# Patient Record
Sex: FEMALE | Race: WHITE | NOT HISPANIC OR LATINO | URBAN - METROPOLITAN AREA
[De-identification: names, ages, dates, MRNs, and addresses within clinical notes are randomized per-mention and may not be internally consistent; named-entity substitution may affect disease eponyms.]

---

## 2023-02-23 LAB
ALANINE AMINOTRANSFERASE (SGPT) (U/L) IN SER/PLAS: 25 U/L (ref 7–45)
ALBUMIN (G/DL) IN SER/PLAS: 4.4 G/DL (ref 3.4–5)
ALKALINE PHOSPHATASE (U/L) IN SER/PLAS: 43 U/L (ref 33–110)
ANION GAP IN SER/PLAS: 13 MMOL/L (ref 10–20)
ASPARTATE AMINOTRANSFERASE (SGOT) (U/L) IN SER/PLAS: 24 U/L (ref 9–39)
BASOPHILS (10*3/UL) IN BLOOD BY AUTOMATED COUNT: 0.02 X10E9/L (ref 0–0.1)
BASOPHILS/100 LEUKOCYTES IN BLOOD BY AUTOMATED COUNT: 0.2 % (ref 0–2)
BILIRUBIN TOTAL (MG/DL) IN SER/PLAS: 0.6 MG/DL (ref 0–1.2)
CALCIUM (MG/DL) IN SER/PLAS: 9.7 MG/DL (ref 8.6–10.3)
CARBON DIOXIDE, TOTAL (MMOL/L) IN SER/PLAS: 24 MMOL/L (ref 21–32)
CHLORIDE (MMOL/L) IN SER/PLAS: 103 MMOL/L (ref 98–107)
CHOLESTEROL (MG/DL) IN SER/PLAS: 229 MG/DL (ref 0–199)
CHOLESTEROL IN HDL (MG/DL) IN SER/PLAS: 42.5 MG/DL
CHOLESTEROL/HDL RATIO: 5.4
COBALAMIN (VITAMIN B12) (PG/ML) IN SER/PLAS: 168 PG/ML (ref 211–911)
CREATININE (MG/DL) IN SER/PLAS: 0.55 MG/DL (ref 0.5–1.05)
EOSINOPHILS (10*3/UL) IN BLOOD BY AUTOMATED COUNT: 0.1 X10E9/L (ref 0–0.7)
EOSINOPHILS/100 LEUKOCYTES IN BLOOD BY AUTOMATED COUNT: 1.2 % (ref 0–6)
ERYTHROCYTE DISTRIBUTION WIDTH (RATIO) BY AUTOMATED COUNT: 13.7 % (ref 11.5–14.5)
ERYTHROCYTE MEAN CORPUSCULAR HEMOGLOBIN CONCENTRATION (G/DL) BY AUTOMATED: 31.8 G/DL (ref 32–36)
ERYTHROCYTE MEAN CORPUSCULAR VOLUME (FL) BY AUTOMATED COUNT: 90 FL (ref 80–100)
ERYTHROCYTES (10*6/UL) IN BLOOD BY AUTOMATED COUNT: 4.59 X10E12/L (ref 4–5.2)
GFR FEMALE: >90 ML/MIN/1.73M2
GLUCOSE (MG/DL) IN SER/PLAS: 79 MG/DL (ref 74–99)
HEMATOCRIT (%) IN BLOOD BY AUTOMATED COUNT: 41.5 % (ref 36–46)
HEMOGLOBIN (G/DL) IN BLOOD: 13.2 G/DL (ref 12–16)
IMMATURE GRANULOCYTES/100 LEUKOCYTES IN BLOOD BY AUTOMATED COUNT: 0.2 % (ref 0–0.9)
LDL: 149 MG/DL (ref 0–99)
LEUKOCYTES (10*3/UL) IN BLOOD BY AUTOMATED COUNT: 8.2 X10E9/L (ref 4.4–11.3)
LYMPHOCYTES (10*3/UL) IN BLOOD BY AUTOMATED COUNT: 2.43 X10E9/L (ref 1.2–4.8)
LYMPHOCYTES/100 LEUKOCYTES IN BLOOD BY AUTOMATED COUNT: 29.7 % (ref 13–44)
MONOCYTES (10*3/UL) IN BLOOD BY AUTOMATED COUNT: 0.54 X10E9/L (ref 0.1–1)
MONOCYTES/100 LEUKOCYTES IN BLOOD BY AUTOMATED COUNT: 6.6 % (ref 2–10)
NEUTROPHILS (10*3/UL) IN BLOOD BY AUTOMATED COUNT: 5.06 X10E9/L (ref 1.2–7.7)
NEUTROPHILS/100 LEUKOCYTES IN BLOOD BY AUTOMATED COUNT: 62.1 % (ref 40–80)
PLATELETS (10*3/UL) IN BLOOD AUTOMATED COUNT: 315 X10E9/L (ref 150–450)
POTASSIUM (MMOL/L) IN SER/PLAS: 3.8 MMOL/L (ref 3.5–5.3)
PROTEIN TOTAL: 7.6 G/DL (ref 6.4–8.2)
SODIUM (MMOL/L) IN SER/PLAS: 136 MMOL/L (ref 136–145)
THYROTROPIN (MIU/L) IN SER/PLAS BY DETECTION LIMIT <= 0.05 MIU/L: 1.1 MIU/L (ref 0.44–3.98)
TRIGLYCERIDE (MG/DL) IN SER/PLAS: 188 MG/DL (ref 0–149)
UREA NITROGEN (MG/DL) IN SER/PLAS: 11 MG/DL (ref 6–23)
VLDL: 38 MG/DL (ref 0–40)

## 2023-03-04 ENCOUNTER — TELEPHONE (OUTPATIENT)
Dept: PRIMARY CARE | Facility: CLINIC | Age: 43
End: 2023-03-04
Payer: COMMERCIAL

## 2023-03-04 DIAGNOSIS — R92.8 ABNORMAL MAMMOGRAM: ICD-10-CM

## 2023-03-04 NOTE — TELEPHONE ENCOUNTER
Per Dr. Stevens, Patient will need extra views from her mammogram and possible u/s  Patient aware  and orders pended for Dr. Stevens

## 2023-04-06 ENCOUNTER — TELEPHONE (OUTPATIENT)
Dept: PRIMARY CARE | Facility: CLINIC | Age: 43
End: 2023-04-06
Payer: COMMERCIAL

## 2023-04-06 NOTE — TELEPHONE ENCOUNTER
----- Message from Lex Stevens, DO sent at 4/6/2023  9:33 AM EDT -----  Ultrasound and mammogram consistent with probable cyst instead of waiting 1 year they want to recheck things in 6 months to be extra careful please put that on her calendar

## 2023-04-24 ENCOUNTER — OFFICE VISIT (OUTPATIENT)
Dept: PRIMARY CARE | Facility: CLINIC | Age: 43
End: 2023-04-24
Payer: COMMERCIAL

## 2023-04-24 VITALS
OXYGEN SATURATION: 99 % | RESPIRATION RATE: 16 BRPM | HEIGHT: 66 IN | WEIGHT: 240 LBS | SYSTOLIC BLOOD PRESSURE: 100 MMHG | DIASTOLIC BLOOD PRESSURE: 64 MMHG | BODY MASS INDEX: 38.57 KG/M2 | TEMPERATURE: 97.3 F | HEART RATE: 88 BPM

## 2023-04-24 DIAGNOSIS — E66.09 CLASS 2 OBESITY DUE TO EXCESS CALORIES WITHOUT SERIOUS COMORBIDITY WITH BODY MASS INDEX (BMI) OF 38.0 TO 38.9 IN ADULT: ICD-10-CM

## 2023-04-24 DIAGNOSIS — Z76.89 ENCOUNTER FOR WEIGHT MANAGEMENT: Primary | ICD-10-CM

## 2023-04-24 PROCEDURE — 3008F BODY MASS INDEX DOCD: CPT | Performed by: FAMILY MEDICINE

## 2023-04-24 PROCEDURE — 99213 OFFICE O/P EST LOW 20 MIN: CPT | Performed by: FAMILY MEDICINE

## 2023-04-24 PROCEDURE — 1036F TOBACCO NON-USER: CPT | Performed by: FAMILY MEDICINE

## 2023-04-24 RX ORDER — SEMAGLUTIDE 0.25 MG/.5ML
0.25 INJECTION, SOLUTION SUBCUTANEOUS
COMMUNITY
Start: 2023-03-30 | End: 2023-04-24 | Stop reason: ALTCHOICE

## 2023-04-24 RX ORDER — SEMAGLUTIDE 0.5 MG/.5ML
0.5 INJECTION, SOLUTION SUBCUTANEOUS
Qty: 2 ML | Refills: 1 | Status: SHIPPED | OUTPATIENT
Start: 2023-04-24 | End: 2023-05-24 | Stop reason: ALTCHOICE

## 2023-04-24 ASSESSMENT — PATIENT HEALTH QUESTIONNAIRE - PHQ9
1. LITTLE INTEREST OR PLEASURE IN DOING THINGS: NOT AT ALL
2. FEELING DOWN, DEPRESSED OR HOPELESS: NOT AT ALL
SUM OF ALL RESPONSES TO PHQ9 QUESTIONS 1 AND 2: 0

## 2023-04-24 NOTE — PROGRESS NOTES
"Subjective   Patient ID: Concetta Dick is a 42 y.o. female who presents for Weight Loss.    HPI    Pt is dieting, exercising and utilizing Wegovy.  Denies any negative SE from the Wegovy.   Pt has lost 15 pounds  Pt BMI today is 38.74  Admits to occasional constipation.    No other concern    Review of systems  ; Patient seen today for exam denies any problems with headaches or vision, denies any shortness of breath chest pain nausea or vomiting, no black stool no blood in the stool no heartburn type symptoms denies any problems with constipation or diarrhea, and no dysuria-type symptoms    The patient's allergies medications were reviewed with them today    The patient's social family and surgical history or also reviewed here today, along with her past medical history.     Objective     Alert and active in  no acute distress  HEENT TMs clear oropharynx negative nares clear no drainage noted neck supple  With no adenopathy   Heart regular rate and rhythm without murmur and no carotid bruits  Lungs- clear to auscultation bilaterally, no wheeze or rhonchi noted  Thyroid -negative masses or nodularity  Abdomen- soft times four quadrants , bowel sounds positive no masses or organomegaly, negative tenderness guarding or rebound  Neurological exam unremarkable- DTRs in upper and lower extremities within normal limits.   skin -no lesions noted      /64 (BP Location: Left arm, Patient Position: Sitting, BP Cuff Size: Large adult)   Pulse 88   Temp 36.3 °C (97.3 °F) (Temporal)   Resp 16   Ht 1.676 m (5' 6\")   Wt 109 kg (240 lb)   SpO2 99%   BMI 38.74 kg/m²     No Known Allergies    Assessment/Plan   Problem List Items Addressed This Visit          Other    Encounter for weight management - Primary     Other Visit Diagnoses       BMI 38.0-38.9,adult        Relevant Medications    semaglutide, weight loss, (Wegovy) 0.5 mg/0.5 mL pen injector    Class 2 obesity due to excess calories without serious " comorbidity with body mass index (BMI) of 38.0 to 38.9 in adult              Increase Wegovy to 0.5 mg.  She is aware that she can get nauseous after the first dose.  If she is doing well, we can increase her to  the next dose, 1 mg.     She will let us know how she is doing in 1 month.    Follow up in office in 3-4 months.    If anything worsens or changes please call us at once, follow up in the office as planned.    Scribe Attestation  By signing my name below, I, Leeann Puga MA, Scribe   attest that this documentation has been prepared under the direction and in the presence of Lex Stevens DO.

## 2023-05-23 ENCOUNTER — TELEPHONE (OUTPATIENT)
Dept: PRIMARY CARE | Facility: CLINIC | Age: 43
End: 2023-05-23

## 2023-05-23 NOTE — TELEPHONE ENCOUNTER
----- Message from Concetta Dick sent at 5/22/2023  8:41 PM EDT -----  Regarding: Your Recent Visit  Contact: [phone]  Hi Dr. Stevens.   I’m just following up from my last visit on 4/24. I took my last injection of the 0.5mg Wegovy yesterday. I am down another 8 lbs this month, and still no side effects.  Can we go up in dosage for my next script?      Thanks!  Concetta Dick

## 2023-07-10 ENCOUNTER — TELEPHONE (OUTPATIENT)
Dept: PRIMARY CARE | Facility: CLINIC | Age: 43
End: 2023-07-10

## 2023-07-10 NOTE — TELEPHONE ENCOUNTER
----- Message from Concetta Dick sent at 7/10/2023  6:39 AM EDT -----  Regarding: Wegovy prescription   Contact: [phone]  Hi Dr. Stevens,  I just finished my last dose of the 1mg Wegovy. I feel good, still losing weight and still having no side effects. I feel like my weight loss has slowed a little, and I am making some changes to my diet and exercise routine. Can we go up in dosage on my next script?     Thanks!  Concetta Dick

## 2023-07-10 NOTE — TELEPHONE ENCOUNTER
Lex Stevens, DO  You3 hours ago (12:46 PM)       Sent 1.7 to her drugstore, see me after 6 weeks   My chart message sent

## 2023-08-07 ENCOUNTER — TELEPHONE (OUTPATIENT)
Dept: PRIMARY CARE | Facility: CLINIC | Age: 43
End: 2023-08-07

## 2023-08-07 NOTE — TELEPHONE ENCOUNTER
----- Message from Concetta Dick sent at 8/7/2023 12:02 PM EDT -----  Regarding: Wegovy prescription   Contact: [phone]  Hi Dr. Stevens,  I just finished my last dose of the 1.7mg Wegovy. I feel good, still losing weight and still having no side effects.  Can we go up to 2.4mg  on my next script?       Thanks!  Concetta Dick   Showing 1 of 1

## 2023-08-07 NOTE — TELEPHONE ENCOUNTER
Please let her know that Dr. Stevens is out of the office this week.  I reviewed things and in my opinion if the 1.7 of Wegovy is working well then I would stay on this dose as we can increase it in the next month or 3 if we need to.        Responded to patient via [employer] message.

## 2023-09-06 NOTE — PROGRESS NOTES
"Subjective   Patient ID: Concetta Dick is a 42 y.o. female who presents for Weight Check.    HPI    Patient presents today for a follow-up on obesity. Is taking Wegovy 1.7 MG. States she's doing well on it, and would like to increase it to the next dose.  Last weight 4-24-23 was 240 lbs.   Has loss 22 pounds since her last visit.  She walks on her days off.  She does walk a lot on the days she is working, approximately 12,000 steps.      Taking current medications which were reviewed.  Problem list discussed.    Overall doing well.  Eating okay.  Staying active.    Has no other new problem /question.    Review of systems  ; Patient seen today for exam denies any problems with headaches or vision, denies any shortness of breath chest pain nausea or vomiting, no black stool no blood in the stool no heartburn type symptoms denies any problems with constipation or diarrhea, and no dysuria-type symptoms    The patient's allergies medications were reviewed with them today    The patient's social family and surgical history or also reviewed here today, along with her past medical history.     Objective     Alert and active in  no acute distress  HEENT TMs clear oropharynx negative nares clear no drainage noted neck supple  With no adenopathy   Heart regular rate and rhythm without murmur and no carotid bruits  Lungs- clear to auscultation bilaterally, no wheeze or rhonchi noted  Thyroid -negative masses or nodularity  Abdomen- soft times four quadrants , bowel sounds positive no masses or organomegaly, negative tenderness guarding or rebound  Neurological exam unremarkable- DTRs in upper and lower extremities within normal limits.   skin -no lesions noted      /70 (BP Location: Right arm, Patient Position: Sitting, BP Cuff Size: Large adult)   Pulse 92   Temp 36 °C (96.8 °F) (Temporal)   Resp 16   Ht 1.676 m (5' 6\")   Wt 98.9 kg (218 lb)   SpO2 96%   BMI 35.19 kg/m²     No Known " Allergies    Assessment/Plan   Problem List Items Addressed This Visit       Encounter for weight management - Primary    BMI 35.0-35.9,adult     Other Visit Diagnoses       Class 2 obesity due to excess calories without serious comorbidity with body mass index (BMI) of 35.0 to 35.9 in adult              Discussed patient's BMI and to institute calorie reduction and increase exercise to decrease risk of diabetes and heart disease in the future.     Increase Wegovy to 2.4 mg.    We will send this for a 3 month supply.   If they do not have this, we will decrease back down to 1.7 mg.     Continue to increase activity and eat healthy diet.    If anything worsens or changes please call us at once, follow up in the office as planned.    Scribe Attestation  By signing my name below, I, Leeann Puga MA, Scribe   attest that this documentation has been prepared under the direction and in the presence of Lex Stevens DO.

## 2023-09-07 ENCOUNTER — OFFICE VISIT (OUTPATIENT)
Dept: PRIMARY CARE | Facility: CLINIC | Age: 43
End: 2023-09-07
Payer: COMMERCIAL

## 2023-09-07 VITALS
RESPIRATION RATE: 16 BRPM | WEIGHT: 218 LBS | HEART RATE: 92 BPM | OXYGEN SATURATION: 96 % | DIASTOLIC BLOOD PRESSURE: 70 MMHG | HEIGHT: 66 IN | SYSTOLIC BLOOD PRESSURE: 116 MMHG | TEMPERATURE: 96.8 F | BODY MASS INDEX: 35.03 KG/M2

## 2023-09-07 DIAGNOSIS — E66.09 CLASS 2 OBESITY DUE TO EXCESS CALORIES WITHOUT SERIOUS COMORBIDITY WITH BODY MASS INDEX (BMI) OF 35.0 TO 35.9 IN ADULT: ICD-10-CM

## 2023-09-07 DIAGNOSIS — Z76.89 ENCOUNTER FOR WEIGHT MANAGEMENT: Primary | ICD-10-CM

## 2023-09-07 PROCEDURE — 1036F TOBACCO NON-USER: CPT | Performed by: FAMILY MEDICINE

## 2023-09-07 PROCEDURE — 99213 OFFICE O/P EST LOW 20 MIN: CPT | Performed by: FAMILY MEDICINE

## 2023-09-07 PROCEDURE — 3008F BODY MASS INDEX DOCD: CPT | Performed by: FAMILY MEDICINE

## 2023-09-07 RX ORDER — SEMAGLUTIDE 2.4 MG/.75ML
2.4 INJECTION, SOLUTION SUBCUTANEOUS
Qty: 9 ML | Refills: 1 | Status: SHIPPED | OUTPATIENT
Start: 2023-09-07 | End: 2024-02-05

## 2024-02-04 DIAGNOSIS — E66.09 CLASS 2 OBESITY DUE TO EXCESS CALORIES WITHOUT SERIOUS COMORBIDITY WITH BODY MASS INDEX (BMI) OF 35.0 TO 35.9 IN ADULT: ICD-10-CM

## 2024-02-05 RX ORDER — SEMAGLUTIDE 2.4 MG/.75ML
2.4 INJECTION, SOLUTION SUBCUTANEOUS
Qty: 3 ML | Refills: 1 | Status: SHIPPED | OUTPATIENT
Start: 2024-02-05 | End: 2024-04-03 | Stop reason: SDUPTHER

## 2024-03-28 DIAGNOSIS — E66.09 CLASS 2 OBESITY DUE TO EXCESS CALORIES WITHOUT SERIOUS COMORBIDITY WITH BODY MASS INDEX (BMI) OF 35.0 TO 35.9 IN ADULT: ICD-10-CM

## 2024-03-28 RX ORDER — SEMAGLUTIDE 2.4 MG/.75ML
2.4 INJECTION, SOLUTION SUBCUTANEOUS
Qty: 3 ML | Refills: 2 | OUTPATIENT
Start: 2024-03-28

## 2024-04-02 NOTE — PROGRESS NOTES
Subjective   Patient ID: Concetta Dick is a 43 y.o. female who presents for Annual Exam and Weight Loss.    HPI    Annual physical   Eats a generally healthy diet   Exercises 3 to 4 x a week  Denies any chest pain,SOB  No Abdominal pain   No black or bloody stools   Urination/BM normal   Last eye apt over 1 year  Last dental apt 3 month  Periods have been normal.  Last Gyn apt 6/2023  No new family h/o cancers or heart disease    Weight loss management Follow up   Taking Wegovy 2.4 mg  Following up for # 1 years   Has been eating a generally healthy diet  Exercises 3 to 4 x per week  What type of exercise walking weight training   Patient last in office weight 218 lbs  Today's in office weight 204 lbs   Patient is - 14 lbs       Review of systems  ; Patient seen today for exam denies any problems with headaches or vision, denies any shortness of breath chest pain nausea or vomiting, no black stool no blood in the stool no heartburn type symptoms denies any problems with constipation or diarrhea, and no dysuria-type symptoms    The patient's allergies medications were reviewed with them today    The patient's social family and surgical history or also reviewed here today, along with her past medical history.     Objective     Alert and active in  no acute distress  HEENT TMs clear oropharynx negative nares clear no drainage noted neck supple  With no adenopathy   Heart regular rate and rhythm without murmur and no carotid bruits  Lungs- clear to auscultation bilaterally, no wheeze or rhonchi noted  Thyroid -negative masses or nodularity  Abdomen- soft times four quadrants , bowel sounds positive no masses or organomegaly, negative tenderness guarding or rebound  Neurological exam unremarkable- DTRs in upper and lower extremities within normal limits.   skin -no lesions noted      /80 (BP Location: Right arm, Patient Position: Sitting, BP Cuff Size: Adult)   Pulse 92   Temp 36.1 °C (97 °F) (Temporal)    "Resp 16   Ht 1.676 m (5' 6\")   Wt 92.5 kg (204 lb)   SpO2 99%   BMI 32.93 kg/m²     No Known Allergies    Assessment/Plan   Problem List Items Addressed This Visit       Encounter for weight management    BMI 35.0-35.9,adult    Class 1 obesity due to excess calories without serious comorbidity with body mass index (BMI) of 32.0 to 32.9 in adult     Other Visit Diagnoses       Routine general medical examination at a health care facility    -  Primary    Relevant Orders    CBC and Auto Differential    Comprehensive Metabolic Panel    B12 deficiency        Relevant Orders    Vitamin B12    Lipid screening        Relevant Orders    Lipid Panel    Breast cancer screening by mammogram        Relevant Orders    BI mammo bilateral screening tomosynthesis    BMI 32.0-32.9,adult        Relevant Medications    semaglutide, weight loss, (Wegovy) 2.4 mg/0.75 mL pen injector          Discussed patient's BMI and to institute calorie reduction and increase exercise to decrease risk of diabetes and heart disease in the future.    Refill Wegovy.    Mammogram ordered.     Declines Tdap vaccine today.    Labs have been ordered, she/he will have these performed and we will contact her/him with results.  (CBC, CMP, Lipid, Vitamin B12)    If anything worsens or changes please call us at once, follow up in the office as planned.    Scribe Attestation  By signing my name below, I, Leeann Puga MA, Scribe   attest that this documentation has been prepared under the direction and in the presence of Lex Stevens DO.         "

## 2024-04-03 ENCOUNTER — OFFICE VISIT (OUTPATIENT)
Dept: PRIMARY CARE | Facility: CLINIC | Age: 44
End: 2024-04-03
Payer: COMMERCIAL

## 2024-04-03 VITALS
TEMPERATURE: 97 F | BODY MASS INDEX: 32.78 KG/M2 | SYSTOLIC BLOOD PRESSURE: 122 MMHG | OXYGEN SATURATION: 99 % | DIASTOLIC BLOOD PRESSURE: 80 MMHG | HEART RATE: 92 BPM | HEIGHT: 66 IN | WEIGHT: 204 LBS | RESPIRATION RATE: 16 BRPM

## 2024-04-03 DIAGNOSIS — Z76.89 ENCOUNTER FOR WEIGHT MANAGEMENT: ICD-10-CM

## 2024-04-03 DIAGNOSIS — Z12.31 BREAST CANCER SCREENING BY MAMMOGRAM: ICD-10-CM

## 2024-04-03 DIAGNOSIS — Z13.220 LIPID SCREENING: ICD-10-CM

## 2024-04-03 DIAGNOSIS — E66.09 CLASS 1 OBESITY DUE TO EXCESS CALORIES WITHOUT SERIOUS COMORBIDITY WITH BODY MASS INDEX (BMI) OF 32.0 TO 32.9 IN ADULT: ICD-10-CM

## 2024-04-03 DIAGNOSIS — E53.8 B12 DEFICIENCY: ICD-10-CM

## 2024-04-03 DIAGNOSIS — Z00.00 ROUTINE GENERAL MEDICAL EXAMINATION AT A HEALTH CARE FACILITY: Primary | ICD-10-CM

## 2024-04-03 PROBLEM — E66.811 CLASS 1 OBESITY DUE TO EXCESS CALORIES WITHOUT SERIOUS COMORBIDITY WITH BODY MASS INDEX (BMI) OF 32.0 TO 32.9 IN ADULT: Status: ACTIVE | Noted: 2024-04-03

## 2024-04-03 PROCEDURE — 1036F TOBACCO NON-USER: CPT | Performed by: FAMILY MEDICINE

## 2024-04-03 PROCEDURE — 99396 PREV VISIT EST AGE 40-64: CPT | Performed by: FAMILY MEDICINE

## 2024-04-03 PROCEDURE — 3008F BODY MASS INDEX DOCD: CPT | Performed by: FAMILY MEDICINE

## 2024-04-03 RX ORDER — SEMAGLUTIDE 2.4 MG/.75ML
2.4 INJECTION, SOLUTION SUBCUTANEOUS
Qty: 9 ML | Refills: 1 | Status: SHIPPED | OUTPATIENT
Start: 2024-04-03

## 2024-04-03 ASSESSMENT — PATIENT HEALTH QUESTIONNAIRE - PHQ9
SUM OF ALL RESPONSES TO PHQ9 QUESTIONS 1 AND 2: 0
2. FEELING DOWN, DEPRESSED OR HOPELESS: NOT AT ALL
1. LITTLE INTEREST OR PLEASURE IN DOING THINGS: NOT AT ALL

## 2024-04-04 ENCOUNTER — TELEPHONE (OUTPATIENT)
Dept: PRIMARY CARE | Facility: CLINIC | Age: 44
End: 2024-04-04
Payer: COMMERCIAL

## 2024-04-04 DIAGNOSIS — R92.8 ABNORMAL MAMMOGRAM: ICD-10-CM

## 2024-04-04 DIAGNOSIS — N60.02 CYST OF LEFT BREAST: ICD-10-CM

## 2024-04-04 NOTE — TELEPHONE ENCOUNTER
Ordered. Need to call patient to let her know.    Primary Information    Source   KapilConcetta starkey (Patient)    Subject   Concetta Dick (Patient)    Topic   Information Request - Get Appointment Information      Summary   Pt of Dr. Stevens   Communication    Information has been provided to Patient.            Concetta Dick called in today schedule her 6 month follow up for her breast US bilateral. Mrs. Dick will need a new order. Please place order and call pt to let her know it is completed so she can call scheduling her best contract number is [phone].

## 2024-04-06 ENCOUNTER — LAB (OUTPATIENT)
Dept: LAB | Facility: LAB | Age: 44
End: 2024-04-06
Payer: COMMERCIAL

## 2024-04-06 DIAGNOSIS — Z00.00 ROUTINE GENERAL MEDICAL EXAMINATION AT A HEALTH CARE FACILITY: ICD-10-CM

## 2024-04-06 DIAGNOSIS — E53.8 B12 DEFICIENCY: ICD-10-CM

## 2024-04-06 DIAGNOSIS — Z13.220 LIPID SCREENING: ICD-10-CM

## 2024-04-06 LAB
ALBUMIN SERPL BCP-MCNC: 4.5 G/DL (ref 3.4–5)
ALP SERPL-CCNC: 39 U/L (ref 33–110)
ALT SERPL W P-5'-P-CCNC: 11 U/L (ref 7–45)
ANION GAP SERPL CALC-SCNC: 10 MMOL/L (ref 10–20)
AST SERPL W P-5'-P-CCNC: 11 U/L (ref 9–39)
BASOPHILS # BLD AUTO: 0.02 X10*3/UL (ref 0–0.1)
BASOPHILS NFR BLD AUTO: 0.3 %
BILIRUB SERPL-MCNC: 0.8 MG/DL (ref 0–1.2)
BUN SERPL-MCNC: 10 MG/DL (ref 6–23)
CALCIUM SERPL-MCNC: 9.5 MG/DL (ref 8.6–10.3)
CHLORIDE SERPL-SCNC: 106 MMOL/L (ref 98–107)
CHOLEST SERPL-MCNC: 205 MG/DL (ref 0–199)
CHOLESTEROL/HDL RATIO: 5.1
CO2 SERPL-SCNC: 26 MMOL/L (ref 21–32)
CREAT SERPL-MCNC: 0.68 MG/DL (ref 0.5–1.05)
EGFRCR SERPLBLD CKD-EPI 2021: >90 ML/MIN/1.73M*2
EOSINOPHIL # BLD AUTO: 0.12 X10*3/UL (ref 0–0.7)
EOSINOPHIL NFR BLD AUTO: 1.7 %
ERYTHROCYTE [DISTWIDTH] IN BLOOD BY AUTOMATED COUNT: 13.7 % (ref 11.5–14.5)
GLUCOSE SERPL-MCNC: 87 MG/DL (ref 74–99)
HCT VFR BLD AUTO: 40 % (ref 36–46)
HDLC SERPL-MCNC: 40.5 MG/DL
HGB BLD-MCNC: 12.9 G/DL (ref 12–16)
IMM GRANULOCYTES # BLD AUTO: 0.01 X10*3/UL (ref 0–0.7)
IMM GRANULOCYTES NFR BLD AUTO: 0.1 % (ref 0–0.9)
LDLC SERPL CALC-MCNC: 142 MG/DL
LYMPHOCYTES # BLD AUTO: 2.63 X10*3/UL (ref 1.2–4.8)
LYMPHOCYTES NFR BLD AUTO: 36.6 %
MCH RBC QN AUTO: 29.3 PG (ref 26–34)
MCHC RBC AUTO-ENTMCNC: 32.3 G/DL (ref 32–36)
MCV RBC AUTO: 91 FL (ref 80–100)
MONOCYTES # BLD AUTO: 0.5 X10*3/UL (ref 0.1–1)
MONOCYTES NFR BLD AUTO: 7 %
NEUTROPHILS # BLD AUTO: 3.91 X10*3/UL (ref 1.2–7.7)
NEUTROPHILS NFR BLD AUTO: 54.3 %
NON HDL CHOLESTEROL: 165 MG/DL (ref 0–149)
NRBC BLD-RTO: 0 /100 WBCS (ref 0–0)
PLATELET # BLD AUTO: 330 X10*3/UL (ref 150–450)
POTASSIUM SERPL-SCNC: 4.1 MMOL/L (ref 3.5–5.3)
PROT SERPL-MCNC: 7.2 G/DL (ref 6.4–8.2)
RBC # BLD AUTO: 4.4 X10*6/UL (ref 4–5.2)
SODIUM SERPL-SCNC: 138 MMOL/L (ref 136–145)
TRIGL SERPL-MCNC: 112 MG/DL (ref 0–149)
VIT B12 SERPL-MCNC: 3135 PG/ML (ref 211–911)
VLDL: 22 MG/DL (ref 0–40)
WBC # BLD AUTO: 7.2 X10*3/UL (ref 4.4–11.3)

## 2024-04-06 PROCEDURE — 82607 VITAMIN B-12: CPT

## 2024-04-06 PROCEDURE — 36415 COLL VENOUS BLD VENIPUNCTURE: CPT

## 2024-04-06 PROCEDURE — 80061 LIPID PANEL: CPT

## 2024-04-06 PROCEDURE — 80053 COMPREHEN METABOLIC PANEL: CPT

## 2024-04-06 PROCEDURE — 85025 COMPLETE CBC W/AUTO DIFF WBC: CPT

## 2024-04-08 ENCOUNTER — TELEPHONE (OUTPATIENT)
Dept: PRIMARY CARE | Facility: CLINIC | Age: 44
End: 2024-04-08
Payer: COMMERCIAL

## 2024-04-11 ENCOUNTER — APPOINTMENT (OUTPATIENT)
Dept: RADIOLOGY | Facility: HOSPITAL | Age: 44
End: 2024-04-11
Payer: COMMERCIAL

## 2024-04-12 ENCOUNTER — TELEPHONE (OUTPATIENT)
Dept: PRIMARY CARE | Facility: CLINIC | Age: 44
End: 2024-04-12
Payer: COMMERCIAL

## 2024-04-12 DIAGNOSIS — N60.02 CYST OF LEFT BREAST: ICD-10-CM

## 2024-04-12 NOTE — TELEPHONE ENCOUNTER
Lili from  radiology called the office stating the patient needed a bilateral diagnostic bilateral mammogram ordered per her last mammogram.

## 2024-04-17 ENCOUNTER — APPOINTMENT (OUTPATIENT)
Dept: RADIOLOGY | Facility: HOSPITAL | Age: 44
End: 2024-04-17
Payer: COMMERCIAL

## 2024-04-22 ENCOUNTER — HOSPITAL ENCOUNTER (OUTPATIENT)
Dept: RADIOLOGY | Facility: HOSPITAL | Age: 44
Discharge: HOME | End: 2024-04-22
Payer: COMMERCIAL

## 2024-04-22 ENCOUNTER — APPOINTMENT (OUTPATIENT)
Dept: RADIOLOGY | Facility: HOSPITAL | Age: 44
End: 2024-04-22
Payer: COMMERCIAL

## 2024-04-22 ENCOUNTER — TELEPHONE (OUTPATIENT)
Dept: PRIMARY CARE | Facility: CLINIC | Age: 44
End: 2024-04-22
Payer: COMMERCIAL

## 2024-04-22 VITALS — HEIGHT: 66 IN | BODY MASS INDEX: 32.47 KG/M2 | WEIGHT: 202 LBS

## 2024-04-22 DIAGNOSIS — N60.02 CYST OF LEFT BREAST: ICD-10-CM

## 2024-04-22 DIAGNOSIS — R92.8 ABNORMAL MAMMOGRAM: ICD-10-CM

## 2024-04-22 PROCEDURE — 77062 BREAST TOMOSYNTHESIS BI: CPT

## 2024-04-22 PROCEDURE — 77062 BREAST TOMOSYNTHESIS BI: CPT | Performed by: RADIOLOGY

## 2024-04-22 PROCEDURE — 76642 ULTRASOUND BREAST LIMITED: CPT | Performed by: RADIOLOGY

## 2024-04-22 PROCEDURE — 76982 USE 1ST TARGET LESION: CPT | Mod: LT

## 2024-04-22 PROCEDURE — 77066 DX MAMMO INCL CAD BI: CPT | Performed by: RADIOLOGY

## 2024-04-22 PROCEDURE — 76642 ULTRASOUND BREAST LIMITED: CPT | Mod: LT

## 2024-04-22 NOTE — TELEPHONE ENCOUNTER
----- Message from Lex Stevens DO sent at 4/22/2024  4:31 PM EDT -----  She is probably aware, she has a cyst on her ultrasound and fibrocystic changes may be less coffee in her diet less caffeine, not none but a lot less would be helpful,,, repeat mammogram in 1 year

## 2024-04-23 ENCOUNTER — APPOINTMENT (OUTPATIENT)
Dept: RADIOLOGY | Facility: HOSPITAL | Age: 44
End: 2024-04-23
Payer: COMMERCIAL

## 2024-09-16 ENCOUNTER — APPOINTMENT (OUTPATIENT)
Dept: OBSTETRICS AND GYNECOLOGY | Facility: CLINIC | Age: 44
End: 2024-09-16
Payer: COMMERCIAL

## 2024-09-27 RX ORDER — SEMAGLUTIDE 2.4 MG/.75ML
2.4 INJECTION, SOLUTION SUBCUTANEOUS
Qty: 3 ML | Refills: 0 | Status: SHIPPED | OUTPATIENT
Start: 2024-09-29

## 2024-10-15 ENCOUNTER — APPOINTMENT (OUTPATIENT)
Dept: OBSTETRICS AND GYNECOLOGY | Facility: CLINIC | Age: 44
End: 2024-10-15
Payer: COMMERCIAL

## 2024-11-11 ENCOUNTER — PREP FOR PROCEDURE (OUTPATIENT)
Dept: OBSTETRICS AND GYNECOLOGY | Facility: CLINIC | Age: 44
End: 2024-11-11

## 2024-11-11 ENCOUNTER — APPOINTMENT (OUTPATIENT)
Dept: OBSTETRICS AND GYNECOLOGY | Facility: CLINIC | Age: 44
End: 2024-11-11
Payer: COMMERCIAL

## 2024-11-11 VITALS
BODY MASS INDEX: 33.27 KG/M2 | HEIGHT: 66 IN | WEIGHT: 207 LBS | DIASTOLIC BLOOD PRESSURE: 78 MMHG | SYSTOLIC BLOOD PRESSURE: 132 MMHG

## 2024-11-11 DIAGNOSIS — Z01.818 PREOP TESTING: ICD-10-CM

## 2024-11-11 DIAGNOSIS — N81.10 PELVIC ORGAN PROLAPSE QUANTIFICATION STAGE 2 CYSTOCELE: Primary | ICD-10-CM

## 2024-11-11 DIAGNOSIS — N81.10 POP-Q STAGE 2 CYSTOCELE: Primary | ICD-10-CM

## 2024-11-11 DIAGNOSIS — R39.15 URINARY URGENCY: ICD-10-CM

## 2024-11-11 LAB
POC APPEARANCE, URINE: CLEAR
POC BILIRUBIN, URINE: NEGATIVE
POC BLOOD, URINE: NEGATIVE
POC COLOR, URINE: YELLOW
POC GLUCOSE, URINE: NEGATIVE MG/DL
POC KETONES, URINE: NEGATIVE MG/DL
POC LEUKOCYTES, URINE: NEGATIVE
POC NITRITE,URINE: NEGATIVE
POC PH, URINE: 6 PH
POC PROTEIN, URINE: NEGATIVE MG/DL
POC SPECIFIC GRAVITY, URINE: 1.02
POC UROBILINOGEN, URINE: 0.2 EU/DL

## 2024-11-11 PROCEDURE — 1036F TOBACCO NON-USER: CPT | Performed by: STUDENT IN AN ORGANIZED HEALTH CARE EDUCATION/TRAINING PROGRAM

## 2024-11-11 PROCEDURE — 99205 OFFICE O/P NEW HI 60 MIN: CPT | Performed by: STUDENT IN AN ORGANIZED HEALTH CARE EDUCATION/TRAINING PROGRAM

## 2024-11-11 PROCEDURE — 81003 URINALYSIS AUTO W/O SCOPE: CPT | Performed by: STUDENT IN AN ORGANIZED HEALTH CARE EDUCATION/TRAINING PROGRAM

## 2024-11-11 PROCEDURE — 3008F BODY MASS INDEX DOCD: CPT | Performed by: STUDENT IN AN ORGANIZED HEALTH CARE EDUCATION/TRAINING PROGRAM

## 2024-11-11 RX ORDER — CEFAZOLIN SODIUM 2 G/100ML
2 INJECTION, SOLUTION INTRAVENOUS ONCE
OUTPATIENT
Start: 2024-11-11 | End: 2024-11-11

## 2024-11-11 RX ORDER — ACETAMINOPHEN 325 MG/1
975 TABLET ORAL ONCE
OUTPATIENT
Start: 2024-11-11 | End: 2024-11-11

## 2024-11-11 RX ORDER — PHENAZOPYRIDINE HYDROCHLORIDE 200 MG/1
200 TABLET, FILM COATED ORAL ONCE
OUTPATIENT
Start: 2024-11-11 | End: 2024-11-11

## 2024-11-11 RX ORDER — CELECOXIB 400 MG/1
400 CAPSULE ORAL ONCE
OUTPATIENT
Start: 2024-11-11 | End: 2024-11-11

## 2024-11-11 RX ORDER — GABAPENTIN 600 MG/1
600 TABLET ORAL ONCE
OUTPATIENT
Start: 2024-11-11 | End: 2024-11-11

## 2024-11-11 ASSESSMENT — PAIN SCALES - GENERAL: PAINLEVEL_OUTOF10: 0-NO PAIN

## 2024-11-11 NOTE — PROGRESS NOTES
New pt here for prolapse and urgency   PVR = 8ml    Chaperone declined: Christianne Cueto, CM3      Referred by: self     PCP  Lex Stevens DO         CHIEF COMPLAINT:  prolapse and urgency         HISTORY OF PRESENT ILLNESS:  This is a  44 y.o. y.o. female who presents with prolapse, urinary urgency, for past year getting worse.     The following were reviewed to gain additional history:  External notes: Dr. Stevens 4/3/24, weight loss, annual exam  OB GYN visits for IUD insertion   Test results: Cr 0.68 24         Specifically, she describes the following pelvic floor symptoms:          Prolapse: Yes       - Splinting to urinate: No       - Splinting for bowel movement/stool trapping: No              Incontinence:  No                        Urinary Symptoms:       - Frequency:  Yes             # Voids:         - Nocturia: Yes             # Voids:  once       - Urgency:  Yes       - Incomplete emptying:  No       - Hesitancy:  No       - Pain with voiding:  No       - Excessive fluid intake: No, 40 oz water                History:       - Recurrent UTI:  No       - Hematuria:  No       - Stones:  No       - Kidney Disease:  No                  Bowel Symptoms:       - Regular: Yes       - Diarrhea:No       - Constipation: No       - Fecal Incontinence:  No       - Flatus Incontinence:  No       - Fecal urgency:   No    Past medical and surgical hx reviewed - pertinent for   PMH none  PSH denies  Smoking history: denies        Gyn History:  - Postmenopause: No  - Pap up to date: No, does not have GYN currently, last pap 6-7 years   History of abnormal pap: No  - Sexually active:  Yes  Dyspareunia: Yes, deeper   Other issues:   - Number of prior vaginal deliveries: 2   Number of prior operative deliveries: 0   Prior OASI? 0  - Number of prior c-sections: 0    - Mammogram up to date: Yes  - Colonoscopy up to date: No    OB History          2    Para   2    Term   2            AB        Living  "            SAB        IAB        Ectopic        Multiple        Live Births                           PHYSICAL EXAMINATION:  Patient's last menstrual period was 10/31/2024.  Body mass index is 33.41 kg/m².  /78   Ht 1.676 m (5' 6\")   Wt 93.9 kg (207 lb)   LMP 10/31/2024   BMI 33.41 kg/m²   General Appearance: well appearing  Neuro: Alert and oriented   HEENT: mucous membranes moist, neck supple  Resp: No respiratory distress, normal work of breathing  MSK: normal range of motion, gait appropriate    Pelvic:  Genitourinary: normal external genitalia, Bartholin's glands negative, [place]'s glands negative  Urethra: normal meatus, non-tender, no periurethral mass  Vaginal mucosa  normal  Cervix normal  Uterus normal size, non-tender, mobile  Adnexae  negative nontender, no masses  Atrophy negative    CST negative    POP-Q (in supine position):       Aa +1     Ba +1     C 0              gh 5     pb 2     tvl 9              Ap 0     Bp 0     D -4    Rectal: no hemorrhoids, fissures or masses    PVR (by Ultrasound): 8 ml     IMPRESSION AND PLAN:  Concetta Dick is a 44 y.o. who presents with Stage 2 POP, urinary urgency.    POP  - reviewed risk factors, natural history and etiology of prolapse  - discussed management options for prolapse including expectant management, PFPT, pessary fitting, and surgery  - opting for surgery  - We discussed options for surgical management of prolapse including robotic surgery with mesh (sacrocolpopexy) and vaginal native tissue approaches and risks and benefits of each  - Counseled on success rates for each approach: ~95% for SCP and ~80-85% for vaginal native tissue approach  - Reviewed risks of mesh, specifically mesh erosion rate which occurs on the order of 2-4% and can often be managed with vaginal estrogen cream but very rarely requires return to operating room for mesh excision  - Reviewed postoperative restrictions: no lifting over 10 pounds for 6 weeks, pelvic rest " for 6 weeks, and no driving for first 1-2 weeks  - she works as an OB/postpartum nurse at [employer], advised likely 2 weeks minimum off work, and reviewed importance of lifting restriction  - offered PREMIER trial enrollment but she prefers a non-mesh procedure and declines  - opting for TVH, SSLF  - Reviewed plan for same-day surgery without overnight stay  - Will need urodynamics prior to surgery? Yes  - Will need PCP/specialist clearance? No  - Surgical plan: TVH, SSLF, A/P repair, cystoscopy, possible sling, location: [facility] preferred but OK with [facility]    OAB  - discussed etiology of OAB and potential management options  - reviewed bladder health recommendations (fluid intake, avoiding bladder triggers)  - briefly discussed treatment options but reviewed will have to see how symptoms settle out after surgery  - if still bothered by urgency post-op can consider medication or other intervention at that time    All questions and concerns were answered and addressed.  The patient expressed understanding and agrees with the plan.     RTC for UDS and virtual visit to review results. Will need to review POUR risk at UDS follow up appointment    11/11/2024

## 2024-11-13 RX ORDER — SEMAGLUTIDE 2.4 MG/.75ML
2.4 INJECTION, SOLUTION SUBCUTANEOUS
Qty: 3 ML | Refills: 0 | Status: SHIPPED | OUTPATIENT
Start: 2024-11-17

## 2024-11-14 ENCOUNTER — TELEPHONE (OUTPATIENT)
Dept: OBSTETRICS AND GYNECOLOGY | Facility: CLINIC | Age: 44
End: 2024-11-14
Payer: COMMERCIAL

## 2024-11-14 PROBLEM — N81.10 POP-Q STAGE 2 CYSTOCELE: Status: ACTIVE | Noted: 2024-11-11

## 2024-11-14 NOTE — TELEPHONE ENCOUNTER
Call to patient to schedule surgery with [facility]. Patient agrees with date of 12/17/2024 for a vaginal hysterectomy, bilateral salpingectomy, possible mid-urethral sling, possible anterior and posterior repairs and cystoscopy.

## 2024-11-20 ENCOUNTER — TELEPHONE (OUTPATIENT)
Dept: OBSTETRICS AND GYNECOLOGY | Facility: CLINIC | Age: 44
End: 2024-11-20
Payer: COMMERCIAL

## 2024-11-20 NOTE — TELEPHONE ENCOUNTER
FMLA/ STD forms completed for pt 12/17/24 surgery. Faxed to [facility] @ [phone]. Copy scanned into chart.

## 2024-11-25 ENCOUNTER — TELEPHONE (OUTPATIENT)
Dept: OBSTETRICS AND GYNECOLOGY | Facility: CLINIC | Age: 44
End: 2024-11-25
Payer: COMMERCIAL

## 2024-11-25 NOTE — TELEPHONE ENCOUNTER
FMLA forms updated to reflect 6 wks Post -op recovery. Faxed to [facility] @ [phone]. Copy scanned into chart. ELROY informing pt.

## 2024-12-06 ENCOUNTER — OFFICE VISIT (OUTPATIENT)
Dept: OBSTETRICS AND GYNECOLOGY | Facility: CLINIC | Age: 44
End: 2024-12-06
Payer: COMMERCIAL

## 2024-12-06 ENCOUNTER — APPOINTMENT (OUTPATIENT)
Dept: OBSTETRICS AND GYNECOLOGY | Facility: CLINIC | Age: 44
End: 2024-12-06
Payer: COMMERCIAL

## 2024-12-06 DIAGNOSIS — N39.3 SUI (STRESS URINARY INCONTINENCE, FEMALE): Primary | ICD-10-CM

## 2024-12-06 DIAGNOSIS — N39.3 SUI (STRESS URINARY INCONTINENCE, FEMALE): ICD-10-CM

## 2024-12-06 DIAGNOSIS — N81.10 POP-Q STAGE 2 CYSTOCELE: ICD-10-CM

## 2024-12-06 PROCEDURE — 51741 ELECTRO-UROFLOWMETRY FIRST: CPT | Performed by: STUDENT IN AN ORGANIZED HEALTH CARE EDUCATION/TRAINING PROGRAM

## 2024-12-06 PROCEDURE — 99214 OFFICE O/P EST MOD 30 MIN: CPT | Mod: 25 | Performed by: STUDENT IN AN ORGANIZED HEALTH CARE EDUCATION/TRAINING PROGRAM

## 2024-12-06 PROCEDURE — 51798 US URINE CAPACITY MEASURE: CPT | Performed by: STUDENT IN AN ORGANIZED HEALTH CARE EDUCATION/TRAINING PROGRAM

## 2024-12-06 PROCEDURE — 51797 INTRAABDOMINAL PRESSURE TEST: CPT | Performed by: STUDENT IN AN ORGANIZED HEALTH CARE EDUCATION/TRAINING PROGRAM

## 2024-12-06 PROCEDURE — 51729 CYSTOMETROGRAM W/VP&UP: CPT | Performed by: STUDENT IN AN ORGANIZED HEALTH CARE EDUCATION/TRAINING PROGRAM

## 2024-12-06 PROCEDURE — 51784 ANAL/URINARY MUSCLE STUDY: CPT | Performed by: STUDENT IN AN ORGANIZED HEALTH CARE EDUCATION/TRAINING PROGRAM

## 2024-12-06 NOTE — PROGRESS NOTES
Patient ID: Concetta Dick is a 44 y.o. female.    Uroflowmetry    Date/Time: 12/6/2024 1:21 PM    Performed by: Mirna Strauss MA  Authorized by: Shahla Donald MD    Procedure discussed: discussed risks, benefits and alternatives    Chaperone present: no    Timeout: timeout called immediately prior to procedure    Position: sitting    Procedure Details     Procedure: CMG with UPP/voiding pressures, EMG, intra-abdominal voiding study and uroflow      CMG with UPP/Voiding Pressures Details:     First urge to void (mL): 146    Urgency to void (mL): 203    Bladder capacity (mL): 300    Intra-abdominal Voiding Study Details:     Rectal catheter placed to record intra-abdominal pressure: yes      Uroflow Details:     Pre-void volume (mL): 206.3    Voiding duration (sec): 14.7    Average flow rate (mL/sec): 14    Max flow rate (mL/sec): 34.6    Voided urine (mL): 433    Residual urine (mL): 0    Post-Procedure Details     Outcome: patient tolerated procedure well with no complications      Additional Details      + stress incontinence.    Urodynamics Results    Uroflowmetry:   Maximum Flow Rate: 34.6 ml/s   Average Flow: 14.0 ml/s   Volume Voided: 206.3 ml   Post void residual: 50 ml    Cystometry:   First desire: 146 ml   P detrusor: -2 cm H2O   Strong desire: 2-2  ml   P detrusor: -3 cm H2O  Bladder Capacity: 300 ml   End filling detrusor pressure: -1 cm H2O   Bladder sensation: normal    Detrusor activity: normal (negative DO)    Compliance: normal  ROMEO: + cough leak at 300 ml  Urethral pressure profile (UPP): fair  Maximum urethral closure pressure: 44 cm H2O    Voiding Study:   Maximum flow: 28.7 ml/s   Average flow: 10.1 ml/s   P detrusor at peak flow: 3.6 cm H2O   Volume voided: 433.3 ml   Pattern:  bell curve   Mechanism of voiding: detrusor contraction  Detrusor contraction with void: good    UDS INTERPRETATION    Uroflow: normal voided volume, appropriate flow pattern, PVR 50 ml    CMG: normal compliance,  normal sensation, negative DO, + ROMEO    Voiding: normal flow pattern, normal detrusor pressure, normal voiding mechanism    SUMMARY:  +ROMEO, candidate for anti-incontinence procedure at time of prolapse repair if desired    Shahla Donald MD

## 2024-12-06 NOTE — PROGRESS NOTES
HISTORY OF PRESENT ILLNESS:  Concetta Dick is a 44 y.o. female, who presents in follow up for stage 2 uterovaginal prolapse     During last encounter on 11/11/24, reviewed and agreed to the following:  Concetta Dick is a 44 y.o. who presents with Stage 2 POP, urinary urgency.     POP  - reviewed risk factors, natural history and etiology of prolapse  - discussed management options for prolapse including expectant management, PFPT, pessary fitting, and surgery  - opting for surgery  - We discussed options for surgical management of prolapse including robotic surgery with mesh (sacrocolpopexy) and vaginal native tissue approaches and risks and benefits of each  - Counseled on success rates for each approach: ~95% for SCP and ~80-85% for vaginal native tissue approach  - Reviewed risks of mesh, specifically mesh erosion rate which occurs on the order of 2-4% and can often be managed with vaginal estrogen cream but very rarely requires return to operating room for mesh excision  - Reviewed postoperative restrictions: no lifting over 10 pounds for 6 weeks, pelvic rest for 6 weeks, and no driving for first 1-2 weeks  - 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 Yes  - Will need PCP/specialist clearance? No  - Surgical plan: TVH, SSLF, A/P repair, cystoscopy, possible sling, location: [facility] preferred but OK with [place]     OAB  - discussed etiology of OAB and potential management options  - reviewed bladder health recommendations (fluid intake, avoiding bladder triggers)  - briefly discussed treatment options but reviewed will have to see how symptoms settle out after surgery  - if still bothered by urgency post-op  can consider medication or other intervention at that time     All questions and concerns were answered and addressed.  The patient expressed understanding and agrees with the plan.      RTC for UDS and virtual visit to review results. Will need to review POUR risk at UDS follow up appointment    Today she reports   Here for UDS results (had UDS done today, added onto schedule for UDS follow up given time constraints with scheduling)    The following were reviewed to gain additional history:  External notes: n/a  Test results:   UDS  Confirmed ROMEO  Normal voiding mechanism  Negative DO, normal capacity, normal compliance          PHYSICAL EXAMINATION:  Patient's last menstrual period was 10/31/2024.  There is no height or weight on file to calculate BMI.  LMP 10/31/2024     General Appearance: well appearing  Neuro: Alert and oriented   HEENT: mucous membranes moist, neck supple  Resp: No respiratory distress, normal work of breathing  MSK: normal range of motion, gait appropriate    Pelvic: deferred  IMPRESSION AND PLAN:  Concetta Dick is a 44 y.o. who presents in follow up for UDS follow up    Pre-op  - reviewed findings of UDS confirming ROMEO as above  - Reviewed options for surgical management of ROMEO: midurethral sling versus periurethral bulking injections and steps of each procedure  - Counseled on success rates of each: sling ~95% successful and bulkamid 60-70% success  - Reviewed risks/benefits of each option. Sling carries the benefit of higher success rate but has 2 week recovery period with no lifting over 10 pounds, and involves mesh which carries a 2-4% risk of mesh exposure. Bulking carries lower success rate but has no recovery period and no mesh risk involved.  - Counseled on management of mesh exposure if that should happen: often managed with vaginal estrogen in the office and rarely requires return to OR for mesh excision  - Reviewed risk of urinary retention requiring indwelling catheter  temporarily which would be removed in the office following surgery  - she is currently not bothered by ROMEO and would like to take a conservative approach of just repairing prolapse and monitoring for occult ROMEO in the future  - Reviewed POUR risk possibly requiring catheter for management and she understands    Works as OB/PP nurse at [employer]    Overall surgical plan:  TVH, SSLF, A/P repair, cystoscopy  Schedule 12/17/24    All questions and concerns were answered and addressed.  The patient expressed understanding and agrees with the plan.     Shahla Donald MD

## 2024-12-09 ENCOUNTER — APPOINTMENT (OUTPATIENT)
Dept: PRIMARY CARE | Facility: CLINIC | Age: 44
End: 2024-12-09
Payer: COMMERCIAL

## 2024-12-09 VITALS
TEMPERATURE: 97.2 F | HEIGHT: 66 IN | DIASTOLIC BLOOD PRESSURE: 72 MMHG | BODY MASS INDEX: 33.11 KG/M2 | OXYGEN SATURATION: 99 % | SYSTOLIC BLOOD PRESSURE: 118 MMHG | HEART RATE: 82 BPM | WEIGHT: 206 LBS | RESPIRATION RATE: 16 BRPM

## 2024-12-09 DIAGNOSIS — Z76.89 ENCOUNTER FOR WEIGHT MANAGEMENT: ICD-10-CM

## 2024-12-09 DIAGNOSIS — E66.811 CLASS 1 OBESITY DUE TO EXCESS CALORIES WITHOUT SERIOUS COMORBIDITY WITH BODY MASS INDEX (BMI) OF 33.0 TO 33.9 IN ADULT: ICD-10-CM

## 2024-12-09 DIAGNOSIS — E66.09 CLASS 1 OBESITY DUE TO EXCESS CALORIES WITHOUT SERIOUS COMORBIDITY WITH BODY MASS INDEX (BMI) OF 33.0 TO 33.9 IN ADULT: ICD-10-CM

## 2024-12-09 DIAGNOSIS — K59.03 DRUG-INDUCED CONSTIPATION: Primary | ICD-10-CM

## 2024-12-09 PROCEDURE — 1036F TOBACCO NON-USER: CPT | Performed by: FAMILY MEDICINE

## 2024-12-09 PROCEDURE — 3008F BODY MASS INDEX DOCD: CPT | Performed by: FAMILY MEDICINE

## 2024-12-09 PROCEDURE — 99213 OFFICE O/P EST LOW 20 MIN: CPT | Performed by: FAMILY MEDICINE

## 2024-12-09 NOTE — PROGRESS NOTES
Subjective   Patient ID: Concetta Dick is a 44 y.o. female who presents for Weight Loss.  HPI  Weight loss management Follow up   Taking Wegovy 2.4 mg weekly, 2 doses remaining.   Following up for # over 1 years   Has been eating a generally healthy diet  Exercises 3 to 4 x per week  Type of exercise - walking and weight training   Patient last in-office weight 207 lbs  Today's in-office weight 206 lbs   Patient is -1 lbs   Pt wants to discuss other weight loss options   Pt states that the Wegovy is not working for her      At times she is getting more constipated and little frustrated with that also    Sometimes nausea at this higher dose like to see what other options she has such as Zepbound which we discussed in the past      Does get some neck aches and pains at times 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 Donald.         No other concern /question       Review of systems  ; Patient seen today for exam denies any problems with headaches or vision, denies any shortness of breath chest pain nausea or vomiting, no black stool no blood in the stool no heartburn type symptoms denies any problems with constipation or diarrhea, and no dysuria-type symptoms    The patient's allergies medications were reviewed with them today    The patient's social family and surgical history or also reviewed here today, along with her past medical history.     Objective     Alert and active in  no acute distress  HEENT TMs clear oropharynx negative nares clear no drainage noted neck supple  With no adenopathy   Heart regular rate and rhythm without murmur and no carotid bruits  Lungs- clear to auscultation bilaterally, no wheeze or rhonchi noted  Thyroid -negative masses or nodularity  Abdomen- soft times four quadrants , bowel sounds positive no  "masses or organomegaly, negative tenderness guarding or rebound  Neurological exam unremarkable- DTRs in upper and lower extremities within normal limits.   skin -no lesions noted      /72 (BP Location: Right arm, Patient Position: Sitting, BP Cuff Size: Large adult)   Pulse 82   Temp 36.2 °C (97.2 °F) (Temporal)   Resp 16   Ht 1.676 m (5' 6\")   Wt 93.4 kg (206 lb)   LMP 10/31/2024   SpO2 99%   BMI 33.25 kg/m²     No Known Allergies    Assessment/Plan   Problem List Items Addressed This Visit       Encounter for weight management    BMI 33.0-33.9,adult    Relevant Medications    tirzepatide, weight loss, (Zepbound) 10 mg/0.5 mL injection    Class 1 obesity due to excess calories without serious comorbidity with body mass index (BMI) of 33.0 to 33.9 in adult     Other Visit Diagnoses       Drug-induced constipation    -  Primary          Discussed patient's BMI and to institute calorie reduction and increase exercise to decrease risk of diabetes and heart disease in the future.    Given her poor response to Wegovy, we prescribed Zepbound today, to be used 10 mg weekly.  And we will see how she does if things are doing better after her surgery may go back on Wegovy with Topamax also to help with curbing her appetite    If anything worsens or changes please call us at once, follow up in the office as planned,       Scribe Attestation  By signing my name below, I, Daya Noguera, Scribe   attest that this documentation has been prepared under the direction and in the presence of Lex Stevens DO.  "

## 2024-12-16 ENCOUNTER — APPOINTMENT (OUTPATIENT)
Dept: OBSTETRICS AND GYNECOLOGY | Facility: CLINIC | Age: 44
End: 2024-12-16
Payer: COMMERCIAL

## 2024-12-16 NOTE — DISCHARGE INSTRUCTIONS
"  UROGYNECOLOGY POST-OPERATIVE INSTRUCTIONS    GENERAL:  It is recommended that a responsible adult stay with you for 24 hours after receiving anesthesia. Do not make any important decisions, sign any important documents, or drive for about 24 hours.    FOOD:  You can eat your normal regular food.  There are no restrictions.     ACTIVITY  1. You will feel tired and weak because your body is focusing on healing.  2. We encourage you to walk. You will get tired quickly so take breaks when you need to. Then get up and move around again. It is important NOT to lie in bed all day. Being active throughout the recovery process is the best way to speed up your healing and avoid complications.   3. If you had vaginal surgery, it will hurt to sit.  Sit on \"one cheek\" or the other, use a soft cushion or sofa, or consider purchasing a \"donut\" pillow from your local pharmacy.  4. You can go outside the house.   5. You can go up and down the stairs.   6. No formal exercising (e.g. gym, treadmill, bike, weight-lifting, power walking etc) until cleared by Dr. Donald  7. You can do light housework (e.g. dusting, light meals, very small loads of laundry)  8. Do not  things over 10 lbs (about the weight of one gallon of milk).  9. No driving for 1 week or while taking narcotic pain medication. You can drive once pain is improved and you feel you are able to comfortably push on the brakes (quickly if needed to avoid an accident) and navigate your vehicle.  10. You can shower but no tub soaks until cleared by Dr. Donald.      PAIN:  Unfortunately surgery hurts!  But there are lots of ways to control pain.    1) Ice packs or warm packs help a lot with pain!  You can use either, whichever feels better to you. If you don't have reusable ice packs at home, consider freezing some water bottles to place over the vaginal area. Apply ice for 20 minutes at a time.  2) Alternate between tylenol 1000 mg and ibuprofen 600 mg every 3 hours. " So when you wake up, take tylenol 1000 mg, then three hours later take ibuprofen 600 mg, then three hours later tylenol 1000 mg, etc. This will ensure you always have some strong pain medicine in your system to help prevent pain. Follow this schedule for at least the first three days after surgery, then you can space out the doses if you feel like your pain is minimal.  3) Save the narcotic pain medication (if you were given narcotics) for prior to bedtime.  Take it in the daytime ONLY if Tylenol XS, ibuprofen, and cold/warm packs are not controlling the pain.  4) Narcotics (such as oxycodone or dilaudid) cause constipation! If you are needing to take more than prescribed, please call Dr. Donald's office.      CONSTIPATION: Avoiding constipation is key after surgery!  1) Take 1 capful of Miralax daily  2) If you have not moved your bowels within 48 hours or 2 days of surgery, increase frequency of Miralax to twice a day  3) The goal is to have toothpaste consistency stools. You can decrease the amount of Miralax if you are having diarrhea.  4) If you have not had a bowel movement 5 days after surgery, call Dr. Donald's office.     WOUND CARE:  1.  If you had vaginal surgery, you will have light vaginal bleeding or discharge that will go on for 6 weeks, and possibly longer than that.  You will need a light pad.  If you have heavy bleeding (enough to fully soak a pad in an hour or less), call Dr. Donald's office. You may also notice some clots passing particularly if you just got up from being in bed or sitting for a while - this is normal!  2. If you had robotic or laparoscopic surgery, just keep the incisions clean.  No creams or ointments or dressings.  3. You may shower daily, no special soaps or cleansing agents.  4. Dr. Donald may ask you to insert estrogen cream in the vagina using an applicator.  This is safe and will not hurt you in any way. Sometimes it can stir up a little bleeding, but it is generally  not heavy.    CATHETER CARE (if you go home with a catheter):  1. Simply empty the bag every 3-4 hours.  2. You can shower and let the catheter get wet, pat it dry with a towel.  3. Dr. Donald's office will call you to set up an appointment for catheter removal. If you have not heard from them within 1-2 business days, call the office.    REASONS TO CALL US  1. If you have a fever (temperature more than 100.3 degrees). Please check your temperature prior to calling.  2. If your pain is not controlled after taking the medications as recommended above.  3. If you are vomiting and unable to hold down food or liquid.  4. If you are unable to urinate despite having an urge to do so. Similarly, if you have a catheter in place and have a strong urge to urinate but are not seeing urine draining into the bag, give us a call.  5. If you are unable to have a bowel movement despite following the recommendations listed above.    If you ever feel that something isn't right or you have concerns, please don't hesitate to call the office!    HOW TO REACH US:  [place] patients: [phone], option #2 then option #3  [place] patients: [phone], option #2 then option #3  [place] patients: [phone]

## 2024-12-17 ENCOUNTER — ANESTHESIA EVENT (OUTPATIENT)
Dept: OPERATING ROOM | Facility: HOSPITAL | Age: 44
End: 2024-12-17
Payer: COMMERCIAL

## 2024-12-17 ENCOUNTER — HOSPITAL ENCOUNTER (OUTPATIENT)
Facility: HOSPITAL | Age: 44
Setting detail: OUTPATIENT SURGERY
Discharge: HOME | End: 2024-12-17
Attending: STUDENT IN AN ORGANIZED HEALTH CARE EDUCATION/TRAINING PROGRAM | Admitting: STUDENT IN AN ORGANIZED HEALTH CARE EDUCATION/TRAINING PROGRAM
Payer: COMMERCIAL

## 2024-12-17 ENCOUNTER — ANESTHESIA (OUTPATIENT)
Dept: OPERATING ROOM | Facility: HOSPITAL | Age: 44
End: 2024-12-17
Payer: COMMERCIAL

## 2024-12-17 VITALS
HEIGHT: 66 IN | HEART RATE: 77 BPM | DIASTOLIC BLOOD PRESSURE: 76 MMHG | SYSTOLIC BLOOD PRESSURE: 132 MMHG | TEMPERATURE: 97.5 F | RESPIRATION RATE: 16 BRPM | BODY MASS INDEX: 32.95 KG/M2 | WEIGHT: 205 LBS | OXYGEN SATURATION: 100 %

## 2024-12-17 DIAGNOSIS — G89.18 POST-OP PAIN: ICD-10-CM

## 2024-12-17 DIAGNOSIS — N81.10 POP-Q STAGE 2 CYSTOCELE: Primary | ICD-10-CM

## 2024-12-17 LAB
ABO GROUP (TYPE) IN BLOOD: NORMAL
ANION GAP SERPL CALC-SCNC: 13 MMOL/L (ref 10–20)
ANTIBODY SCREEN: NORMAL
BUN SERPL-MCNC: 16 MG/DL (ref 6–23)
CALCIUM SERPL-MCNC: 9 MG/DL (ref 8.6–10.3)
CHLORIDE SERPL-SCNC: 105 MMOL/L (ref 98–107)
CO2 SERPL-SCNC: 25 MMOL/L (ref 21–32)
CREAT SERPL-MCNC: 0.64 MG/DL (ref 0.5–1.05)
EGFRCR SERPLBLD CKD-EPI 2021: >90 ML/MIN/1.73M*2
ERYTHROCYTE [DISTWIDTH] IN BLOOD BY AUTOMATED COUNT: 14.2 % (ref 11.5–14.5)
GLUCOSE SERPL-MCNC: 99 MG/DL (ref 74–99)
HCG UR QL IA.RAPID: NEGATIVE
HCT VFR BLD AUTO: 39.5 % (ref 36–46)
HGB BLD-MCNC: 12.9 G/DL (ref 12–16)
MCH RBC QN AUTO: 29.6 PG (ref 26–34)
MCHC RBC AUTO-ENTMCNC: 32.7 G/DL (ref 32–36)
MCV RBC AUTO: 91 FL (ref 80–100)
NRBC BLD-RTO: 0 /100 WBCS (ref 0–0)
PLATELET # BLD AUTO: 264 X10*3/UL (ref 150–450)
POTASSIUM SERPL-SCNC: 3.9 MMOL/L (ref 3.5–5.3)
RBC # BLD AUTO: 4.36 X10*6/UL (ref 4–5.2)
RH FACTOR (ANTIGEN D): NORMAL
SODIUM SERPL-SCNC: 139 MMOL/L (ref 136–145)
WBC # BLD AUTO: 6.5 X10*3/UL (ref 4.4–11.3)

## 2024-12-17 PROCEDURE — 7100000002 HC RECOVERY ROOM TIME - EACH INCREMENTAL 1 MINUTE: Performed by: STUDENT IN AN ORGANIZED HEALTH CARE EDUCATION/TRAINING PROGRAM

## 2024-12-17 PROCEDURE — 7100000010 HC PHASE TWO TIME - EACH INCREMENTAL 1 MINUTE: Performed by: STUDENT IN AN ORGANIZED HEALTH CARE EDUCATION/TRAINING PROGRAM

## 2024-12-17 PROCEDURE — 3700000001 HC GENERAL ANESTHESIA TIME - INITIAL BASE CHARGE: Performed by: STUDENT IN AN ORGANIZED HEALTH CARE EDUCATION/TRAINING PROGRAM

## 2024-12-17 PROCEDURE — 3600000009 HC OR TIME - EACH INCREMENTAL 1 MINUTE - PROCEDURE LEVEL FOUR: Performed by: STUDENT IN AN ORGANIZED HEALTH CARE EDUCATION/TRAINING PROGRAM

## 2024-12-17 PROCEDURE — 36415 COLL VENOUS BLD VENIPUNCTURE: CPT | Performed by: STUDENT IN AN ORGANIZED HEALTH CARE EDUCATION/TRAINING PROGRAM

## 2024-12-17 PROCEDURE — 81025 URINE PREGNANCY TEST: CPT | Performed by: STUDENT IN AN ORGANIZED HEALTH CARE EDUCATION/TRAINING PROGRAM

## 2024-12-17 PROCEDURE — 7100000009 HC PHASE TWO TIME - INITIAL BASE CHARGE: Performed by: STUDENT IN AN ORGANIZED HEALTH CARE EDUCATION/TRAINING PROGRAM

## 2024-12-17 PROCEDURE — 2500000001 HC RX 250 WO HCPCS SELF ADMINISTERED DRUGS (ALT 637 FOR MEDICARE OP): Performed by: STUDENT IN AN ORGANIZED HEALTH CARE EDUCATION/TRAINING PROGRAM

## 2024-12-17 PROCEDURE — 2500000004 HC RX 250 GENERAL PHARMACY W/ HCPCS (ALT 636 FOR OP/ED): Performed by: STUDENT IN AN ORGANIZED HEALTH CARE EDUCATION/TRAINING PROGRAM

## 2024-12-17 PROCEDURE — A57288 PR SLING OPER STRES INCONTINENCE: Performed by: ANESTHESIOLOGIST ASSISTANT

## 2024-12-17 PROCEDURE — C1771 REP DEV, URINARY, W/SLING: HCPCS | Performed by: STUDENT IN AN ORGANIZED HEALTH CARE EDUCATION/TRAINING PROGRAM

## 2024-12-17 PROCEDURE — 2720000007 HC OR 272 NO HCPCS: Performed by: STUDENT IN AN ORGANIZED HEALTH CARE EDUCATION/TRAINING PROGRAM

## 2024-12-17 PROCEDURE — 3600000004 HC OR TIME - INITIAL BASE CHARGE - PROCEDURE LEVEL FOUR: Performed by: STUDENT IN AN ORGANIZED HEALTH CARE EDUCATION/TRAINING PROGRAM

## 2024-12-17 PROCEDURE — 85027 COMPLETE CBC AUTOMATED: CPT | Performed by: STUDENT IN AN ORGANIZED HEALTH CARE EDUCATION/TRAINING PROGRAM

## 2024-12-17 PROCEDURE — 2500000001 HC RX 250 WO HCPCS SELF ADMINISTERED DRUGS (ALT 637 FOR MEDICARE OP): Performed by: ANESTHESIOLOGY

## 2024-12-17 PROCEDURE — A57288 PR SLING OPER STRES INCONTINENCE: Performed by: ANESTHESIOLOGY

## 2024-12-17 PROCEDURE — 2500000004 HC RX 250 GENERAL PHARMACY W/ HCPCS (ALT 636 FOR OP/ED): Performed by: ANESTHESIOLOGIST ASSISTANT

## 2024-12-17 PROCEDURE — 86901 BLOOD TYPING SEROLOGIC RH(D): CPT | Performed by: STUDENT IN AN ORGANIZED HEALTH CARE EDUCATION/TRAINING PROGRAM

## 2024-12-17 PROCEDURE — 3700000002 HC GENERAL ANESTHESIA TIME - EACH INCREMENTAL 1 MINUTE: Performed by: STUDENT IN AN ORGANIZED HEALTH CARE EDUCATION/TRAINING PROGRAM

## 2024-12-17 PROCEDURE — 2500000004 HC RX 250 GENERAL PHARMACY W/ HCPCS (ALT 636 FOR OP/ED): Mod: JZ | Performed by: STUDENT IN AN ORGANIZED HEALTH CARE EDUCATION/TRAINING PROGRAM

## 2024-12-17 PROCEDURE — 2780000003 HC OR 278 NO HCPCS: Performed by: STUDENT IN AN ORGANIZED HEALTH CARE EDUCATION/TRAINING PROGRAM

## 2024-12-17 PROCEDURE — 7100000001 HC RECOVERY ROOM TIME - INITIAL BASE CHARGE: Performed by: STUDENT IN AN ORGANIZED HEALTH CARE EDUCATION/TRAINING PROGRAM

## 2024-12-17 PROCEDURE — C2631 REP DEV, URINARY, W/O SLING: HCPCS | Performed by: STUDENT IN AN ORGANIZED HEALTH CARE EDUCATION/TRAINING PROGRAM

## 2024-12-17 PROCEDURE — 2500000005 HC RX 250 GENERAL PHARMACY W/O HCPCS: Performed by: ANESTHESIOLOGIST ASSISTANT

## 2024-12-17 PROCEDURE — 80048 BASIC METABOLIC PNL TOTAL CA: CPT | Performed by: STUDENT IN AN ORGANIZED HEALTH CARE EDUCATION/TRAINING PROGRAM

## 2024-12-17 DEVICE — SLING, DESARA, BLUE TV: Type: IMPLANTABLE DEVICE | Site: URETHRA | Status: FUNCTIONAL

## 2024-12-17 RX ORDER — OXYCODONE AND ACETAMINOPHEN 5; 325 MG/1; MG/1
1 TABLET ORAL EVERY 4 HOURS PRN
Status: DISCONTINUED | OUTPATIENT
Start: 2024-12-17 | End: 2024-12-17 | Stop reason: HOSPADM

## 2024-12-17 RX ORDER — IBUPROFEN 600 MG/1
600 TABLET ORAL EVERY 6 HOURS
Qty: 30 TABLET | Refills: 0 | Status: SHIPPED | OUTPATIENT
Start: 2024-12-17

## 2024-12-17 RX ORDER — IBUPROFEN 600 MG/1
600 TABLET ORAL EVERY 6 HOURS PRN
Status: DISCONTINUED | OUTPATIENT
Start: 2024-12-17 | End: 2024-12-17 | Stop reason: HOSPADM

## 2024-12-17 RX ORDER — CEFAZOLIN SODIUM 2 G/100ML
2 INJECTION, SOLUTION INTRAVENOUS ONCE
Status: COMPLETED | OUTPATIENT
Start: 2024-12-17 | End: 2024-12-17

## 2024-12-17 RX ORDER — VASOPRESSIN 20 [USP'U]/ML
INJECTION, SOLUTION INTRAVENOUS CONTINUOUS PRN
Status: COMPLETED | OUTPATIENT
Start: 2024-12-17 | End: 2024-12-17

## 2024-12-17 RX ORDER — PROPOFOL 10 MG/ML
INJECTION, EMULSION INTRAVENOUS AS NEEDED
Status: DISCONTINUED | OUTPATIENT
Start: 2024-12-17 | End: 2024-12-17

## 2024-12-17 RX ORDER — ONDANSETRON HYDROCHLORIDE 2 MG/ML
4 INJECTION, SOLUTION INTRAVENOUS ONCE AS NEEDED
Status: DISCONTINUED | OUTPATIENT
Start: 2024-12-17 | End: 2024-12-17 | Stop reason: HOSPADM

## 2024-12-17 RX ORDER — ONDANSETRON HYDROCHLORIDE 2 MG/ML
INJECTION, SOLUTION INTRAVENOUS AS NEEDED
Status: DISCONTINUED | OUTPATIENT
Start: 2024-12-17 | End: 2024-12-17

## 2024-12-17 RX ORDER — FENTANYL CITRATE 50 UG/ML
INJECTION, SOLUTION INTRAMUSCULAR; INTRAVENOUS AS NEEDED
Status: DISCONTINUED | OUTPATIENT
Start: 2024-12-17 | End: 2024-12-17

## 2024-12-17 RX ORDER — ALBUTEROL SULFATE 0.83 MG/ML
2.5 SOLUTION RESPIRATORY (INHALATION) ONCE AS NEEDED
Status: DISCONTINUED | OUTPATIENT
Start: 2024-12-17 | End: 2024-12-17 | Stop reason: HOSPADM

## 2024-12-17 RX ORDER — ACETAMINOPHEN 325 MG/1
975 TABLET ORAL ONCE
Status: DISCONTINUED | OUTPATIENT
Start: 2024-12-17 | End: 2024-12-17 | Stop reason: HOSPADM

## 2024-12-17 RX ORDER — PHENAZOPYRIDINE HYDROCHLORIDE 100 MG/1
200 TABLET, FILM COATED ORAL ONCE AS NEEDED
Status: DISCONTINUED | OUTPATIENT
Start: 2024-12-17 | End: 2024-12-17 | Stop reason: HOSPADM

## 2024-12-17 RX ORDER — OXYCODONE HYDROCHLORIDE 10 MG/1
10 TABLET ORAL EVERY 4 HOURS PRN
Status: DISCONTINUED | OUTPATIENT
Start: 2024-12-17 | End: 2024-12-17 | Stop reason: HOSPADM

## 2024-12-17 RX ORDER — HYDROMORPHONE HYDROCHLORIDE 1 MG/ML
1 INJECTION, SOLUTION INTRAMUSCULAR; INTRAVENOUS; SUBCUTANEOUS EVERY 5 MIN PRN
Status: DISCONTINUED | OUTPATIENT
Start: 2024-12-17 | End: 2024-12-17 | Stop reason: HOSPADM

## 2024-12-17 RX ORDER — OXYCODONE HYDROCHLORIDE 5 MG/1
5 TABLET ORAL EVERY 6 HOURS PRN
Qty: 10 TABLET | Refills: 0 | Status: SHIPPED | OUTPATIENT
Start: 2024-12-17 | End: 2024-12-20

## 2024-12-17 RX ORDER — MIDAZOLAM HYDROCHLORIDE 1 MG/ML
INJECTION, SOLUTION INTRAMUSCULAR; INTRAVENOUS AS NEEDED
Status: DISCONTINUED | OUTPATIENT
Start: 2024-12-17 | End: 2024-12-17

## 2024-12-17 RX ORDER — LIDOCAINE HYDROCHLORIDE 20 MG/ML
INJECTION, SOLUTION EPIDURAL; INFILTRATION; INTRACAUDAL; PERINEURAL AS NEEDED
Status: DISCONTINUED | OUTPATIENT
Start: 2024-12-17 | End: 2024-12-17

## 2024-12-17 RX ORDER — MIDAZOLAM HYDROCHLORIDE 1 MG/ML
1 INJECTION, SOLUTION INTRAMUSCULAR; INTRAVENOUS ONCE AS NEEDED
Status: DISCONTINUED | OUTPATIENT
Start: 2024-12-17 | End: 2024-12-17 | Stop reason: HOSPADM

## 2024-12-17 RX ORDER — HYDROMORPHONE HYDROCHLORIDE 1 MG/ML
INJECTION, SOLUTION INTRAMUSCULAR; INTRAVENOUS; SUBCUTANEOUS AS NEEDED
Status: DISCONTINUED | OUTPATIENT
Start: 2024-12-17 | End: 2024-12-17

## 2024-12-17 RX ORDER — PHENAZOPYRIDINE HYDROCHLORIDE 100 MG/1
200 TABLET, FILM COATED ORAL ONCE
Status: COMPLETED | OUTPATIENT
Start: 2024-12-17 | End: 2024-12-17

## 2024-12-17 RX ORDER — SCOPOLAMINE 1 MG/3D
1 PATCH, EXTENDED RELEASE TRANSDERMAL ONCE
Status: DISCONTINUED | OUTPATIENT
Start: 2024-12-17 | End: 2024-12-17 | Stop reason: HOSPADM

## 2024-12-17 RX ORDER — NEOSTIGMINE METHYLSULFATE 1 MG/ML
INJECTION INTRAVENOUS AS NEEDED
Status: DISCONTINUED | OUTPATIENT
Start: 2024-12-17 | End: 2024-12-17

## 2024-12-17 RX ORDER — ACETAMINOPHEN 500 MG
1000 TABLET ORAL EVERY 6 HOURS
Qty: 60 TABLET | Refills: 0 | Status: SHIPPED | OUTPATIENT
Start: 2024-12-17

## 2024-12-17 RX ORDER — ROCURONIUM BROMIDE 50 MG/5 ML
SYRINGE (ML) INTRAVENOUS AS NEEDED
Status: DISCONTINUED | OUTPATIENT
Start: 2024-12-17 | End: 2024-12-17

## 2024-12-17 RX ORDER — SODIUM CHLORIDE, SODIUM LACTATE, POTASSIUM CHLORIDE, CALCIUM CHLORIDE 600; 310; 30; 20 MG/100ML; MG/100ML; MG/100ML; MG/100ML
INJECTION, SOLUTION INTRAVENOUS CONTINUOUS PRN
Status: DISCONTINUED | OUTPATIENT
Start: 2024-12-17 | End: 2024-12-17

## 2024-12-17 RX ORDER — GLYCOPYRROLATE 0.2 MG/ML
INJECTION INTRAMUSCULAR; INTRAVENOUS AS NEEDED
Status: DISCONTINUED | OUTPATIENT
Start: 2024-12-17 | End: 2024-12-17

## 2024-12-17 RX ORDER — PHENYLEPHRINE HYDROCHLORIDE 10 MG/ML
INJECTION INTRAVENOUS AS NEEDED
Status: DISCONTINUED | OUTPATIENT
Start: 2024-12-17 | End: 2024-12-17

## 2024-12-17 RX ORDER — ACETAMINOPHEN 325 MG/1
975 TABLET ORAL ONCE
Status: COMPLETED | OUTPATIENT
Start: 2024-12-17 | End: 2024-12-17

## 2024-12-17 RX ORDER — SODIUM CHLORIDE, SODIUM LACTATE, POTASSIUM CHLORIDE, CALCIUM CHLORIDE 600; 310; 30; 20 MG/100ML; MG/100ML; MG/100ML; MG/100ML
125 INJECTION, SOLUTION INTRAVENOUS CONTINUOUS
Status: ACTIVE | OUTPATIENT
Start: 2024-12-17 | End: 2024-12-17

## 2024-12-17 RX ORDER — GABAPENTIN 600 MG/1
600 TABLET ORAL ONCE
Status: COMPLETED | OUTPATIENT
Start: 2024-12-17 | End: 2024-12-17

## 2024-12-17 RX ORDER — METOPROLOL TARTRATE 1 MG/ML
INJECTION, SOLUTION INTRAVENOUS AS NEEDED
Status: DISCONTINUED | OUTPATIENT
Start: 2024-12-17 | End: 2024-12-17

## 2024-12-17 RX ORDER — DEXAMETHASONE SODIUM PHOSPHATE 10 MG/ML
INJECTION INTRAMUSCULAR; INTRAVENOUS AS NEEDED
Status: DISCONTINUED | OUTPATIENT
Start: 2024-12-17 | End: 2024-12-17

## 2024-12-17 RX ORDER — HYDRALAZINE HYDROCHLORIDE 20 MG/ML
5 INJECTION INTRAMUSCULAR; INTRAVENOUS EVERY 30 MIN PRN
Status: DISCONTINUED | OUTPATIENT
Start: 2024-12-17 | End: 2024-12-17 | Stop reason: HOSPADM

## 2024-12-17 RX ORDER — HYDROMORPHONE HYDROCHLORIDE 0.2 MG/ML
0.1 INJECTION INTRAMUSCULAR; INTRAVENOUS; SUBCUTANEOUS EVERY 5 MIN PRN
Status: DISCONTINUED | OUTPATIENT
Start: 2024-12-17 | End: 2024-12-17 | Stop reason: HOSPADM

## 2024-12-17 RX ORDER — CELECOXIB 200 MG/1
400 CAPSULE ORAL ONCE
Status: COMPLETED | OUTPATIENT
Start: 2024-12-17 | End: 2024-12-17

## 2024-12-17 SDOH — HEALTH STABILITY: MENTAL HEALTH: CURRENT SMOKER: 0

## 2024-12-17 ASSESSMENT — ENCOUNTER SYMPTOMS
NERVOUS/ANXIOUS: 0
CHILLS: 0
LIGHT-HEADEDNESS: 0
DIARRHEA: 0
DIZZINESS: 0
CONSTIPATION: 0
FREQUENCY: 0
WEAKNESS: 0
DYSURIA: 0
FEVER: 0
SHORTNESS OF BREATH: 0
ABDOMINAL PAIN: 0
HEMATURIA: 0
FATIGUE: 0
COUGH: 0

## 2024-12-17 ASSESSMENT — PAIN SCALES - GENERAL
PAINLEVEL_OUTOF10: 0 - NO PAIN
PAINLEVEL_OUTOF10: 5 - MODERATE PAIN
PAINLEVEL_OUTOF10: 0 - NO PAIN
PAINLEVEL_OUTOF10: 3
PAINLEVEL_OUTOF10: 0 - NO PAIN

## 2024-12-17 ASSESSMENT — PAIN - FUNCTIONAL ASSESSMENT
PAIN_FUNCTIONAL_ASSESSMENT: 0-10

## 2024-12-17 ASSESSMENT — COLUMBIA-SUICIDE SEVERITY RATING SCALE - C-SSRS
1. IN THE PAST MONTH, HAVE YOU WISHED YOU WERE DEAD OR WISHED YOU COULD GO TO SLEEP AND NOT WAKE UP?: NO
2. HAVE YOU ACTUALLY HAD ANY THOUGHTS OF KILLING YOURSELF?: NO
6. HAVE YOU EVER DONE ANYTHING, STARTED TO DO ANYTHING, OR PREPARED TO DO ANYTHING TO END YOUR LIFE?: NO

## 2024-12-17 ASSESSMENT — PAIN DESCRIPTION - LOCATION: LOCATION: PELVIS

## 2024-12-17 ASSESSMENT — PAIN SCALES - PAIN ASSESSMENT IN ADVANCED DEMENTIA (PAINAD): TOTALSCORE: MEDICATION (SEE MAR)

## 2024-12-17 NOTE — H&P
"History Of Present Illness  Concetta Dick is a 44 y.o. female presenting with stage 3 uterovaginal prolapse .     Past Medical History  She has no past medical history on file.    Surgical History  She has a past surgical history that includes Other surgical history (2019).     OB History  OB History    Para Term  AB Living   2 2 2         SAB IAB Ectopic Multiple Live Births                  # Outcome Date GA Lbr Silvestre/2nd Weight Sex Type Anes PTL Lv   2 Term            1 Term                Sexual History  Social History     Substance and Sexual Activity   Sexual Activity Yes    Partners: Male        Social History  She reports that she has never smoked. She has never used smokeless tobacco. She reports that she does not drink alcohol and does not use drugs.    Family History  Family History   Problem Relation Name Age of Onset    Hypertension Mother      Diabetes type II Mother      Diabetes type II Father      Other (bladder cancer) Father          Allergies  Patient has no known allergies.    Physical Exam  Gen: no acute distress  ENT: neck supple, mucous membranes moist  Pulm: normal respiratory effort  Abd: abdomen soft, non-tender  Neuro: alert and oriented  MSK: full ROM  Ext: no significant edema  Psych: appropriate affect     Last Recorded Vitals  /72   Pulse 95   Temp 36.8 °C (98.2 °F) (Temporal)   Resp 18   Ht 1.676 m (5' 6\")   Wt 93 kg (205 lb)   SpO2 99%   BMI 33.09 kg/m²          Assessment/Plan   Assessment & Plan  POP-Q stage 2 cystocele        Concetta Dick is a 44 y.o. who presents with POP, desires surgical management. Plan is for TVH, BS, SSLF, A/P repair, sling, cystoscopy.           Shahla Donald MD    "

## 2024-12-17 NOTE — ANESTHESIA PROCEDURE NOTES
Airway  Date/Time: 12/17/2024 7:54 AM  Urgency: elective    Airway not difficult    Staffing  Performed: HARRIS   Authorized by: Maria Morrissey MD    Performed by: HARRIS [place]  Patient location during procedure: OR    Indications and Patient Condition  Indications for airway management: anesthesia  Spontaneous Ventilation: absent  Sedation level: deep  Preoxygenated: yes  Patient position: sniffing  MILS maintained throughout  Mask difficulty assessment: 1 - vent by mask    Final Airway Details  Final airway type: endotracheal airway      Successful airway: ETT  Cuffed: yes   Successful intubation technique: direct laryngoscopy  Endotracheal tube insertion site: oral  Blade: Inderjit  Blade size: #3  ETT size (mm): 7.5  Cormack-Lehane Classification: grade I - full view of glottis  Placement verified by: chest auscultation and capnometry   Cuff volume (mL): 6  Measured from: lips  ETT to lips (cm): 22  Number of attempts at approach: 1    Additional Comments  Lips and teeth in pre anesthetic conditions after laryngoscopy

## 2024-12-17 NOTE — OP NOTE
Total vaginal hysterectomy, bilateral salpingectomy, Possible anterior/posterior repair, Cystoscopy, Sacrospinous ligament fixation, Mid-urethral sling Operative Note     Date: 2024  OR Location: [facility]    Name: Concetta Dick, : 1980, Age: 44 y.o., MRN: [id number], Sex: female    Diagnosis  Pre-op Diagnosis      * POP-Q stage 2 cystocele [N81.10] Post-op Diagnosis     * POP-Q stage 2 cystocele [N81.10]     Procedures  Total vaginal hysterectomy, bilateral salpingectomy  48041 - AL VAGINAL HYSTERECTOMY UTERUS 250 GM/<    Possible anterior/posterior repair  78221 - AL CMBND ANTERPOST COLPORRAPHY W/CYSTO    Cystoscopy  33422 - AL CYSTOURETHROSCOPY    Sacrospinous ligament fixation  30367 - AL COLPOPEXY VAGINAL EXTRAPERITONEAL APPROACH    Mid-urethral sling      Surgeons      * Shahla Donald - Primary    Resident/Fellow/Other Assistant:  Surgeons and Role:     * Afua Reid MD - Fellow    Staff:   Circulator: Sherly  Scrub Person: Lily  Scrub Person: Ruth Ann    Anesthesia Staff: Anesthesiologist: Maria Morrissey MD  C-AA: HARRIS York    Procedure Summary  Anesthesia: General  ASA: II  Estimated Blood Loss: 200 mL  Intra-op Medications:   Administrations occurring from 0730 to 1130 on 24:   Medication Name Total Dose   vasopressin (Vasostrict) injection 20 Units   dexAMETHasone (Decadron) PF injection 10 mg/mL 10 mg   fentaNYL (Sublimaze) injection 50 mcg/mL 250 mcg   glycopyrrolate (Robinul) injection 0.4 mg   ketamine injection 50 mg/ 5 mL (10 mg/mL) 50 mg   LR infusion Cannot be calculated   lidocaine PF (Xylocaine-MPF) local injection 2 % 80 mg   metoprolol tartrate (Lopressor) injection 2 mg   midazolam (Versed) injection 1 mg/mL 2 mg   neostigmine (Bloxiverz) 10 mg/10 mL injection 3 mg   ondansetron (Zofran) 2 mg/mL injection 4 mg   phenylephrine (Federico-Synephrine) injection 500 mcg   propofol (Diprivan) injection 10 mg/mL 1,200.45 mg   rocuronium (Zemuron) 50 mg/5 mL  prefilled syringe 50 mg   ceFAZolin (Ancef) 2 g in dextrose (iso)  mL 2 g              Anesthesia Record               Intraprocedure I/O Totals          Intake    ceFAZolin (Ancef) 2 g in dextrose (iso)  mL 100.00 mL    Total Intake 100 mL          Specimen:   ID Type Source Tests Collected by Time   1 : UTERUS, CERVIX, BILATERAL FALLOPIAN TUBES Tissue UTERUS, CERVIX, AND BILATERAL FALLOPIAN TUBES SURGICAL PATHOLOGY EXAM Shahla Donald MD 12/17/2024 0910                 Drains and/or Catheters:   Urethral Catheter (Active)       Tourniquet Times:         Implants:  Implants       Type Name Action Serial No.       SUTURE PDS II 1 TP-1 LAURA MONO 48 LP Used, Not Implanted       SUTURE PDS II 1 TP-1 LAURA MONO 48 LP Used, Not Implanted      General Urology SLING, NISREEN CHENG TV - [id number] Implanted               Findings: see below    Indications: Concetta Dick is an 44 y.o. female who is having surgery for POP-Q stage 2 cystocele [N81.10].     The patient was seen in the preoperative area. The risks, benefits, complications, treatment options, non-operative alternatives, expected recovery and outcomes were discussed with the patient. The possibilities of reaction to medication, pulmonary aspiration, injury to surrounding structures, bleeding, recurrent infection, the need for additional procedures, failure to diagnose a condition, and creating a complication requiring transfusion or operation were discussed with the patient. The patient concurred with the proposed plan, giving informed consent.  The site of surgery was properly noted/marked if necessary per policy. The patient has been actively warmed in preoperative area. Preoperative antibiotics have been ordered and given within 1 hours of incision. Venous thrombosis prophylaxis have been ordered including bilateral sequential compression devices    Procedure Details:   Preoperative diagnosis:  Stage 3 uterovaginal prolapse   Stress urinary  incontinence    Postoperative diagnosis:  Stage 3 uterovaginal prolapse   Stress urinary incontinence    Procedure:  Total vaginal hysterectomy   Bilateral salpingectomy   Sacrospinous ligament fixation  Midurethral sling   Cystoscopy   Posterior repair and perineorrhaphy     Attending surgeon:  Shahla Donald MD    Fellow:  Afua Reid MD    Anesthesia:  GETA    Specimen: uterus, cervix, bilateral fallopian tubes    Complications: None apparent    Findings:  Uterus: small, mobile  Adnexae: unremarkable      With traction, vaginal support of the following structures with respect to the hymen was:  Cervix: +1 cm     Colpotomy revealed the following:  Fallopian tubes and ovaries appeared normal     Cystoscopy at completion of procedure:  Ureteral patency: demonstrated  Bladder integrity: initial pass of right sling arm was into bladder, subsequent pass was appropriately placed    Indication:   Symptomatic uterovaginal prolapse  Symptomatic stress urinary incontinence  Desires surgical management     Narrative:  General anesthetic was administered and the patient was placed in the dorsal lithotomy position in candy cane stirrups and prepped and draped in the normal sterile fashion.       Vaginal hysterectomy:   Initial attempt was made to map out jae points to reach to the sacrospinous ligament but it was determined this would need to be done after the hysterectomy given anterior and left lateral points would not reach. Vasopressin was injected circumferentially around the cervix along the lines of the jae, and the incision was made with scalpel. Initial attempt was made at posterior entry but nodularity and scarring was noted due to suspected endometriosis. The space between the bladder and uterus was sharply dissected and entered.  Decision was made to begin taking bites given the bladder and rectum were confirmed to be far enough away and posterior entry was not achievable at this time. The uterosacral  ligaments were clamped, transected, and suture ligated. The uterine arteries and cardinal ligaments were then clamped, transected, and suture ligated. At this time, we were able to palpate around the uterus from the anterior side and achieve safe posterior entry. The long weighted speculum was placed in the posterior cul-de-sac. Additional bites were taken bilaterally and clamped and suture ligated. The uteroovarian ligaments were then clamped and transected. 0 Vicryl sutures were used throughout. All pedicles were examined and hemostasis was assured.        The ovaries and fallopian tubes appeared normal.    The bilateral salpingectomy was performed as follows. The mesosalpinx was clamped, transected and ligated with interrupted 0-vicryl sutures. Hemostasis was confirmed. The bowel was confirmed to be well away throughout this process.    Sacrospinous ligament suspension:  Points on the cuff were selected that would reach to the sacrospinous ligament. The colpotomy was closed with 2-0 Vicryl enough to allow for all points to reach the ligament.     The pararectal space between the peritoneum and posterior vaginal wall was developed with blunt dissection without difficulty. Blunt dissection was used to open the adventitial tissues near the ischial spine. The sacrospinous ligament was clearly palpated. Two passes of a Capio ligature were placed through the right sacrospinous ligament. Each Capio suture was then attached to a loop of  #1 PDS and pulled through the ligament to establish four strands in the ligament.       The PDS sutures in the sacrospinous ligament were placed through the vaginal apex. The sutures were tied with approximation of the vaginal cuff to the sacrospinous ligament. Cystoscopy was performed with findings as described above.     Sling:  Two Allis clamps were placed on the anterior vaginal mucosa at the level of the bladder neck and 1 cm from the urethral meatus, respectively. Vasopressin was  infiltrated in each periurethral space. A midline incision was made between the two Allises using a scalpel. The vaginal mucosa was dissected from the underlying pubocervical fascia to the level of the inferior pubic rami bilaterally. With the bladder empty the Mariah retropubic sling needle with Desara mesh attached was advanced through the endopelvic fascia, the space of Retzius and the abdominal wall to a premarked spot. The same procedure was carried out on the contralateral side. Care was taken to avoid bladder injury and cystoscopy showed the absence of bladder penetration. Cystoscopy demonstrated efflux of urine from both ureteral orifices and normal bladder. The sling mesh was adjusted so that it rested underneath the midportion of the urethra in a tension free manner. The plastic sleeves were carefully removed while the suburethral portion of the sling was stabilized.  The suprapubic incisions were closed with skin glue and the remainder of the anterior vaginal wall was closed with 3-0 Vicryl suture.     Posterior repair:  A posterior repair was performed by excising a triangular-shaped wedge of posterior vaginal wall after infiltration of vasopressin. The rectovaginal fascia was reapproximated with interrupted 2-0 PDS sutures and the bulbocavernosus muscles were reapproximated with 2-0 PDS suture. The vaginal epithelium was reapproximated with a 2-0 Vicryl in a running fashion. A rectal exam was done, demonstrating normal rectum without any evidence of injury or transgression of sutures.     The Barr catheter was left to drain. The patient tolerated the procedures well. Sponge, instrument and needle counts were correct. The patient was awakened from general anesthesia and brought to the recovery room.    I was present and scrubbed for the entire procedure and performed all key aspects of the procedure myself.    Shahla Donald MD    Complications:  None; patient tolerated the procedure well.     Disposition: PACU - hemodynamically stable.  Condition: stable                 Additional Details: none    Attending Attestation: I was present and scrubbed for the entire procedure.    Shahla Donald  Phone Number: [phone]

## 2024-12-17 NOTE — H&P
History Of Present Illness  Concetta Dick is a 44 y.o. female presenting with stage 2 prolapse and stress incontinence here for scheduled surgery.     Past Medical History  History reviewed. No pertinent past medical history.    Surgical History  Past Surgical History:   Procedure Laterality Date    OTHER SURGICAL HISTORY  06/13/2019    No history of surgery        Social History  She reports that she has never smoked. She has never used smokeless tobacco. She reports that she does not drink alcohol and does not use drugs.    Family History  Family History   Problem Relation Name Age of Onset    Hypertension Mother      Diabetes type II Mother      Diabetes type II Father      Other (bladder cancer) Father          Allergies  Patient has no known allergies.    Review of Systems   Constitutional:  Negative for chills, fatigue and fever.   Respiratory:  Negative for cough and shortness of breath.    Cardiovascular:  Negative for chest pain.   Gastrointestinal:  Negative for abdominal pain, constipation and diarrhea.   Genitourinary:  Negative for dyspareunia, dysuria, frequency, hematuria, pelvic pain and urgency.   Neurological:  Negative for dizziness, weakness and light-headedness.   Psychiatric/Behavioral:  The patient is not nervous/anxious.         Physical Exam  Constitutional:       General: She is not in acute distress.     Appearance: Normal appearance.   Cardiovascular:      Rate and Rhythm: Normal rate.   Pulmonary:      Effort: Pulmonary effort is normal.   Abdominal:      General: Abdomen is flat. There is no distension.      Palpations: Abdomen is soft.      Tenderness: There is no abdominal tenderness.   Musculoskeletal:         General: Normal range of motion.   Skin:     General: Skin is warm and dry.   Neurological:      General: No focal deficit present.      Mental Status: She is alert and oriented to person, place, and time. Mental status is at baseline.   Psychiatric:         Mood and  "Affect: Mood normal.         Behavior: Behavior normal.          Last Recorded Vitals  Blood pressure 129/72, pulse 95, temperature 36.8 °C (98.2 °F), temperature source Temporal, resp. rate 18, height 1.676 m (5' 6\"), weight 93 kg (205 lb), SpO2 99%.       Assessment/Plan   Assessment & Plan  POP-Q stage 2 cystocele      Plan for TVH, BS, SSLF, possible APR, mid-urethral sling, cystoscopy.    Patient desires sling today, does not want staged procedure.    Risks of surgery reviewed. Recovery expectations and restrictions discussed. Consent signed.    Afua Reid MD  Urogynecology and Reconstructive Pelvic Surgery Fellow  12/17/2024 7:22 AM      "

## 2024-12-17 NOTE — ANESTHESIA PREPROCEDURE EVALUATION
Patient: Concetta Dick    Procedure Information       Anesthesia Start Date/Time: 12/17/24 0745    Procedures:       Total vaginal hysterectomy, bilateral salpingectomy - VAGINAL HYSTERECTOMY WITH BILATERAL SALPINGECTOMY  POSSIBLE ANTERIOR AND POSTERIOR REPAIRS      Possible anterior/posterior repair      Cystoscopy      Sacrospinous ligament fixation      Mid-urethral sling    Location: [facility] OR 03 / [facility]    Surgeons: Shahla Donald MD            Relevant Problems   Endocrine   (+) Class 1 obesity due to excess calories without serious comorbidity with body mass index (BMI) of 33.0 to 33.9 in adult       Clinical information reviewed:   Tobacco  Allergies  Meds   Med Hx  Surg Hx  OB Status  Fam Hx  Soc   Hx        NPO Detail:  NPO/Void Status  Carbohydrate Drink Given Prior to Surgery? : N  Date of Last Liquid: 12/16/24  Time of Last Liquid: 2200  Date of Last Solid: 12/16/24  Time of Last Solid: 1900  Last Intake Type: Clear fluids  Time of Last Void: 0615         Physical Exam    Airway  Mallampati: II  TM distance: >3 FB  Neck ROM: full     Cardiovascular - normal exam  Rhythm: regular  Rate: normal     Dental - normal exam     Pulmonary - normal exam  Breath sounds clear to auscultation     Abdominal   (+) obese  Abdomen: soft  Bowel sounds: normal           Anesthesia Plan    History of general anesthesia?: no  History of complications of general anesthesia?: unknown/emergency    ASA 2     general   (No family Hx of Severe Adverse Reactions to anesthetics.)  The patient is not a current smoker.  Patient was previously instructed to abstain from smoking on day of procedure.  Patient did not smoke on day of procedure.  Education provided regarding risk of obstructive sleep apnea.  intravenous induction   Postoperative administration of opioids is intended.  Anesthetic plan and risks discussed with patient.  Use of blood products discussed with patient who consented to blood products.    Plan  discussed with HARRIS.

## 2024-12-17 NOTE — ANESTHESIA POSTPROCEDURE EVALUATION
Patient: Concetta Dick    Procedure Summary       Date: 12/17/24 Room / Location: [facility] OR 03 / [facility]    Anesthesia Start: 0745 Anesthesia Stop: 1123    Procedures:       Total vaginal hysterectomy, bilateral salpingectomy (Vagina )      Possible anterior/posterior repair (Vagina )      Cystoscopy (Bladder)      Sacrospinous ligament fixation (Vagina )      Mid-urethral sling (Abdomen) Diagnosis:       POP-Q stage 2 cystocele      (POP-Q stage 2 cystocele [N81.10])    Surgeons: Shahla Donald MD Responsible Provider: Maria Morrissey MD    Anesthesia Type: general ASA Status: 2            Anesthesia Type: general    Vitals Value Taken Time   /69 12/17/24 1120   Temp 36.5 12/17/24 1123   Pulse 66 12/17/24 1122   Resp 21 12/17/24 1122   SpO2 100 % 12/17/24 1122   Vitals shown include unfiled device data.    Anesthesia Post Evaluation    Patient location during evaluation: PACU  Patient participation: complete - patient participated  Level of consciousness: awake and alert  Pain management: satisfactory to patient  Airway patency: patent  Cardiovascular status: acceptable  Respiratory status: acceptable, face mask, spontaneous ventilation and nonlabored ventilation  Hydration status: acceptable  Postoperative Nausea and Vomiting: none        No notable events documented.

## 2024-12-18 ENCOUNTER — TELEPHONE (OUTPATIENT)
Dept: PRIMARY CARE | Facility: CLINIC | Age: 44
End: 2024-12-18
Payer: COMMERCIAL

## 2024-12-18 DIAGNOSIS — G44.221 CHRONIC TENSION-TYPE HEADACHE, INTRACTABLE: ICD-10-CM

## 2024-12-18 RX ORDER — SEMAGLUTIDE 1.7 MG/.75ML
1.7 INJECTION, SOLUTION SUBCUTANEOUS WEEKLY
Qty: 3 ML | Refills: 0 | Status: SHIPPED | OUTPATIENT
Start: 2024-12-18 | End: 2025-01-09

## 2024-12-18 RX ORDER — TOPIRAMATE 50 MG/1
TABLET, FILM COATED ORAL
Qty: 60 TABLET | Refills: 5 | Status: SHIPPED | OUTPATIENT
Start: 2024-12-18

## 2024-12-18 NOTE — TELEPHONE ENCOUNTER
PLEASE ADVISE    Concetta [address]CO [id number] [place] [address] Clinical Support Staff (supporting Lex Stevens DO)1 minute ago (3:08 PM)       Hello,  At my latest visit Dr. Stevens prescribed ZepBound.  Unfortunately, my insurance does not cover this medication as much as I would like.  We had discussed possibly reordering the Wegovy and adding Topamax, which I would like to do if possible instead of the ZepBound.   Thank you,   Concetta Dick

## 2024-12-18 NOTE — TELEPHONE ENCOUNTER
PLEASE ADVISE    Concetta Dick  You3 minutes ago (4:26 PM)       My last dose was on Dec 1st. It was Wegovy 2.4mg.

## 2024-12-27 LAB
LABORATORY COMMENT REPORT: NORMAL
PATH REPORT.FINAL DX SPEC: NORMAL
PATH REPORT.GROSS SPEC: NORMAL
PATH REPORT.RELEVANT HX SPEC: NORMAL
PATH REPORT.TOTAL CANCER: NORMAL

## 2025-01-03 NOTE — PROGRESS NOTES
2 week POV   PVR= 60ml     Chaperone declined: Christianne Cueto, CM3      POST OPERATIVE VISIT    The patient is a 44 y.o. female who presents for a postoperative follow up visit.    She underwent TVH, BS, SSLF, sling, cysto, posterior repair on 12/17/24 and is now 2 week(s)  post-op.    Pathology:   A. Uterus, cervix, and bilateral fallopian tubes; total vaginal hysterectomy and bilateral salpingectomy:  -Cervix: Nabothian cysts; focal benign chronic histiocytic inflammation.  -Endometrium: Inactive with cystic changes, focal stromal pseudodecidualization, and tubal metaplasia, consistent with progestin effect  -Myometrium: Adenomyosis; leiomyomas.  -Bilateral fallopian tubes: No significant pathologic findings.  -Serosa: Endometriosis; adhesions.       INTERVAL HISTORY:  Pain x 1 week, improved now.    Prolapse symptoms: No    Urinary Incontinence symptoms:       Stress incontinence: No       Urgency: No       Frequency: No       Urge incontinence: No    Voiding dysfunction symptoms: No    Defecatory symptoms: No, using miralax daily    Fecal Incontinence: No    Pain: a bit, ibuprofen as needed    PHYSICAL EXAM:  There were no vitals taken for this visit.  PVR (by ultrasound): 60 ml    General Appearance: well developed, good nutrition, well groomed, no deformities, normal habitus  Head: normocephalic, and atraumatic  Neck: symmetric, midline trachea,  full range of motion  Respiratory: no dyspnea, negative for intercostal retraction or uses of accessory muscles of respiration  Cardiovascular: peripheral pulses are normal, no swelling, edema or varicosities  Abdomen: Abdomen soft, non-tender, non-distended.  Incisions well-healed     Pelvic:  CST:  negative  External genitalia: normal appearance, normal Bartholin's glands and [place]'s glands   Urethra: normal meatus, non-tender, no periurethral mass,   no evidence of exposed mesh  Vaginal mucosa: normal, no granulation tissue, no strictures,  incisions well-healed,   no evidence of exposed mesh  Cervix absent  Uterus absent  Adnexae  negative, nontender, no mass  Atrophy   No    POP-Q (in supine position):  No significant prolapse    Impression/plan:  44 y.o. female with history of POP, ROMEO who underwent TVH, BS, SSLF, sling, cysto, posterior repair on 12/17/24.    Surgery/pathology discussed with patient: Yes.  She expressed understanding and all questions and concerns were answered and addressed.     She is doing well postoperatively.    - She is healing normally  - Preoperative symptoms resolved  - Continue activity restrictions: no heavy lifting, pelvic rest  - Return in 4 week(s)    Shahla Donald MD

## 2025-01-06 ENCOUNTER — APPOINTMENT (OUTPATIENT)
Dept: OBSTETRICS AND GYNECOLOGY | Facility: CLINIC | Age: 45
End: 2025-01-06
Payer: COMMERCIAL

## 2025-01-06 VITALS
DIASTOLIC BLOOD PRESSURE: 74 MMHG | SYSTOLIC BLOOD PRESSURE: 116 MMHG | WEIGHT: 210 LBS | BODY MASS INDEX: 33.75 KG/M2 | HEIGHT: 66 IN

## 2025-01-06 DIAGNOSIS — Z09 POSTOP CHECK: Primary | ICD-10-CM

## 2025-01-06 DIAGNOSIS — N39.3 SUI (STRESS URINARY INCONTINENCE, FEMALE): ICD-10-CM

## 2025-01-06 PROCEDURE — 1036F TOBACCO NON-USER: CPT | Performed by: STUDENT IN AN ORGANIZED HEALTH CARE EDUCATION/TRAINING PROGRAM

## 2025-01-06 PROCEDURE — 3008F BODY MASS INDEX DOCD: CPT | Performed by: STUDENT IN AN ORGANIZED HEALTH CARE EDUCATION/TRAINING PROGRAM

## 2025-01-06 PROCEDURE — 99024 POSTOP FOLLOW-UP VISIT: CPT | Performed by: STUDENT IN AN ORGANIZED HEALTH CARE EDUCATION/TRAINING PROGRAM

## 2025-01-06 ASSESSMENT — PAIN SCALES - GENERAL: PAINLEVEL_OUTOF10: 0-NO PAIN

## 2025-01-13 ENCOUNTER — TELEPHONE (OUTPATIENT)
Dept: PRIMARY CARE | Facility: CLINIC | Age: 45
End: 2025-01-13
Payer: COMMERCIAL

## 2025-01-13 NOTE — TELEPHONE ENCOUNTER
Lex Stevens, DO  You27 minutes ago (12:51 PM)       Zepbound which is Mounjaro, can be gotten under [facility] weight and vitals and for the lowest dose its 400 for the 5 mg at 549 that is an option for her,, she wants to look into it and let me know or she can do semaglutide,, [facility] GLP-1 through compounding pharmacy there

## 2025-01-13 NOTE — TELEPHONE ENCOUNTER
PLEASE ADVISE    Concetta [address]CO [id number] [place] [address] Clinical Support Staff (supporting Lex Stevens DO)22 minutes ago (11:26 AM)       Hello.  Apparently my insurance company has stopped covering GLP1 medications as of January 1st.  I can no longer afford the Wegovy. I am currently dieting and exercising, just looking for a little more appetite control. Any suggestions for something that’s not a GLP1?    Thanks for your help.   Concetta Dick

## 2025-01-28 ENCOUNTER — TELEPHONE (OUTPATIENT)
Dept: PRIMARY CARE | Facility: CLINIC | Age: 45
End: 2025-01-28
Payer: COMMERCIAL

## 2025-01-28 NOTE — TELEPHONE ENCOUNTER
PLEASE ADVISE    Concetta OROZCO Do [id number] [facility] Clinical Support Staff (supporting Lex Stevens DO)3 minutes ago (1:47 PM)       Hi Dr. Stevens.  So…I know we have discussed different weight loss meds since my visit in Dec. I recently spoke to my insurance company, and apparently they will cover the Zepbound for me. It just has to be written as a 3 month prescription. Is that something that would be possible?       Thanks for your help!  Concetta Dick

## 2025-01-29 NOTE — TELEPHONE ENCOUNTER
Lex Stevens, DO  You8 minutes ago (8:19 AM)       Yes we can do Zepbound for her,,, what was her last dose of semaglutide, Wegovy and when did she last take it therefore I will pick and dose based on that

## 2025-01-29 NOTE — TELEPHONE ENCOUNTER
Lex Stevens, DO  You9 minutes ago (10:27 AM)       I sent in the 5 mg lets have her do that for 4 weeks and if she tolerates that well we will go right up to the 7.5 with her being off of it for 8 weeks and just skeptical about going any higher than that to start, but should do well if it is covered

## 2025-01-29 NOTE — TELEPHONE ENCOUNTER
PATIENT STATES SHE WAS ON WEGOVY 2.4 MG. PATIENT STATES SHE LAST TOOK A DOSE ON DECEMBER 1,2024. SHE WOULD LIKE THIS PRESCRIPTION CALLED INTO [facility] IN [place].

## 2025-02-03 NOTE — TELEPHONE ENCOUNTER
Please advise.     Medication pended for 3 month supply.     Zepbound  Received: 2 days ago  Concetta Dick [id number] [place] [address] Clinical Support Staff  Phone Number: [phone]     Naeem!  Dr. Stevens wrote my prescription for 1 month.  My insurance will cover this at half the price if it is written as a 3 month supply.  Is there any way to get this script sent in for a 3 month supply?    Thanks for your help!

## 2025-02-05 NOTE — PROGRESS NOTES
POST OPERATIVE VISIT    The patient is a 44 y.o. female who presents for a postoperative follow up visit.    She underwent TVH, BS, SSLF, sling, cysto, posterior repair on 12/17/24 and is now 6 week(s)  post-op.    Pathology: previously reviewed    INTERVAL HISTORY:  Doing well.     Prolapse symptoms: No    Urinary Incontinence symptoms:       Stress incontinence: No       Urgency: Yes, resolving over the past 1-2 weeks       Frequency: No       Urge incontinence: No    Voiding dysfunction symptoms: No    Defecatory symptoms: No    Fecal Incontinence: No    Pain: none    PHYSICAL EXAM:  LMP 10/31/2024   PVR (by ultrasound): 20 mL    General Appearance: well developed, good nutrition, well groomed, no deformities, normal habitus  Head: normocephalic, and atraumatic  Neck: symmetric, midline trachea,  full range of motion  Respiratory: no dyspnea, negative for intercostal retraction or uses of accessory muscles of respiration  Cardiovascular: peripheral pulses are normal, no swelling, edema or varicosities  Abdomen: Abdomen soft, non-tender, non-distended. Sling site incisions well-healed     Pelvic:  CST:  negative  External genitalia: normal appearance, normal Bartholin's glands and Kaufman's glands   Urethra: normal meatus, non-tender, no periurethral mass,   no evidence of exposed mesh  Vaginal mucosa: normal, no granulation tissue, no strictures,  incisions well-healed,  no evidence of exposed mesh  Cervix surgically absent  Uterus surgically absent  Adnexae  negative, nontender, no mass  Atrophy   No    POP-Q (in supine position):  No significant prolapse  [place] very well suspended        Impression/plan:  44 y.o. female with history of POP who underwent TVH, BS, SSLF, sling, cysto, GA on 12/17/24.    She is doing well postoperatively.    - She is healing normally  - Preoperative symptoms resolved  - Resume normal activities  - Return in 1 year(s) from surgery    OAB  - mild OAB symptoms, resolving recently  -  advised to RTC if returning/becoming more bothersome    Shahla Donald MD

## 2025-02-07 ENCOUNTER — APPOINTMENT (OUTPATIENT)
Dept: OBSTETRICS AND GYNECOLOGY | Facility: CLINIC | Age: 45
End: 2025-02-07
Payer: COMMERCIAL

## 2025-02-07 VITALS
TEMPERATURE: 98.1 F | SYSTOLIC BLOOD PRESSURE: 134 MMHG | HEIGHT: 66 IN | BODY MASS INDEX: 32.78 KG/M2 | WEIGHT: 204 LBS | OXYGEN SATURATION: 100 % | DIASTOLIC BLOOD PRESSURE: 84 MMHG | RESPIRATION RATE: 16 BRPM | HEART RATE: 87 BPM

## 2025-02-07 DIAGNOSIS — Z09 POSTOP CHECK: Primary | ICD-10-CM

## 2025-02-07 PROCEDURE — 1036F TOBACCO NON-USER: CPT | Performed by: STUDENT IN AN ORGANIZED HEALTH CARE EDUCATION/TRAINING PROGRAM

## 2025-02-07 PROCEDURE — 99211 OFF/OP EST MAY X REQ PHY/QHP: CPT | Mod: GC | Performed by: STUDENT IN AN ORGANIZED HEALTH CARE EDUCATION/TRAINING PROGRAM

## 2025-02-07 PROCEDURE — 3008F BODY MASS INDEX DOCD: CPT | Performed by: STUDENT IN AN ORGANIZED HEALTH CARE EDUCATION/TRAINING PROGRAM

## 2025-02-07 SDOH — ECONOMIC STABILITY: FOOD INSECURITY: WITHIN THE PAST 12 MONTHS, THE FOOD YOU BOUGHT JUST DIDN'T LAST AND YOU DIDN'T HAVE MONEY TO GET MORE.: NEVER TRUE

## 2025-02-07 SDOH — ECONOMIC STABILITY: FOOD INSECURITY: WITHIN THE PAST 12 MONTHS, YOU WORRIED THAT YOUR FOOD WOULD RUN OUT BEFORE YOU GOT MONEY TO BUY MORE.: NEVER TRUE

## 2025-02-07 ASSESSMENT — LIFESTYLE VARIABLES
AUDIT-C TOTAL SCORE: 0
SKIP TO QUESTIONS 9-10: 1
HOW OFTEN DO YOU HAVE A DRINK CONTAINING ALCOHOL: NEVER
HOW MANY STANDARD DRINKS CONTAINING ALCOHOL DO YOU HAVE ON A TYPICAL DAY: PATIENT DOES NOT DRINK
HOW OFTEN DO YOU HAVE SIX OR MORE DRINKS ON ONE OCCASION: NEVER

## 2025-02-07 ASSESSMENT — ENCOUNTER SYMPTOMS
OCCASIONAL FEELINGS OF UNSTEADINESS: 0
LOSS OF SENSATION IN FEET: 0

## 2025-02-07 ASSESSMENT — PAIN SCALES - GENERAL: PAINLEVEL_OUTOF10: 0-NO PAIN

## 2025-04-28 ENCOUNTER — TELEPHONE (OUTPATIENT)
Dept: PRIMARY CARE | Facility: CLINIC | Age: 45
End: 2025-04-28
Payer: COMMERCIAL

## 2025-05-01 ENCOUNTER — OFFICE VISIT (OUTPATIENT)
Dept: PRIMARY CARE | Facility: CLINIC | Age: 45
End: 2025-05-01
Payer: COMMERCIAL

## 2025-05-01 VITALS
OXYGEN SATURATION: 98 % | HEIGHT: 66 IN | HEART RATE: 73 BPM | WEIGHT: 206.6 LBS | DIASTOLIC BLOOD PRESSURE: 72 MMHG | TEMPERATURE: 97.3 F | BODY MASS INDEX: 33.2 KG/M2 | SYSTOLIC BLOOD PRESSURE: 120 MMHG

## 2025-05-01 DIAGNOSIS — Z00.00 ROUTINE GENERAL MEDICAL EXAMINATION AT A HEALTH CARE FACILITY: Primary | ICD-10-CM

## 2025-05-01 PROCEDURE — 99396 PREV VISIT EST AGE 40-64: CPT | Performed by: FAMILY MEDICINE

## 2025-05-01 PROCEDURE — 1036F TOBACCO NON-USER: CPT | Performed by: FAMILY MEDICINE

## 2025-05-01 PROCEDURE — 3008F BODY MASS INDEX DOCD: CPT | Performed by: FAMILY MEDICINE

## 2025-05-01 NOTE — PROGRESS NOTES
"Subjective   Patient ID: Concetta Dick is a 44 y.o. female who presents for annual exam and Weight Management.  HPI    Here for annual exam and checkup    Weight loss management Follow up   Taking Zepbound 5 mg   Following up for month # 4  Has been eating a generally healthy diet  Exercises 4 x per week  What type of exercise walking and weight lifting.  Patient last in office weight 206 lbs  Today's in office weight 206 lbs 9.6 oz  Patient is 9.6 oz  Has previously taken Wegovy without good effect.  No side effects noted. Normal BM.      Review of systems; Patient seen today for exam denies any problems with headaches or vision, denies any shortness of breath chest pain nausea or vomiting, no black stool no blood in the stool no heartburn type symptoms denies any problems with constipation or diarrhea, and no dysuria-type symptoms    The patient's allergies medications were reviewed with them today    The patient's social family and surgical history or also reviewed here today, along with her past medical history.     Objective   Weight reduction 20% since starting the medication  Alert and active in  no acute distress  HEENT TMs clear oropharynx negative nares clear no drainage noted neck supple  With no adenopathy   Heart regular rate and rhythm without murmur and no carotid bruits  Lungs- clear to auscultation bilaterally, no wheeze or rhonchi noted  Thyroid -negative masses or nodularity  Abdomen- soft times four quadrants , bowel sounds positive no masses or organomegaly, negative tenderness guarding or rebound  Neurological exam unremarkable- DTRs in upper and lower extremities within normal limits.   skin -no lesions noted      /72   Pulse 73   Temp 36.3 °C (97.3 °F) (Temporal)   Ht 1.676 m (5' 6\")   Wt 93.7 kg (206 lb 9.6 oz)   LMP 10/31/2024   SpO2 98%   BMI 33.35 kg/m²     Allergies[1]    Assessment/Plan   Problem List Items Addressed This Visit    None  Visit Diagnoses         Routine " general medical examination at a health care facility    -  Primary    Relevant Orders    Comprehensive Metabolic Panel    CBC and Auto Differential    Lipid Panel      Adult BMI 33.0-33.9 kg/sq m        Relevant Medications    tirzepatide, weight loss, (Zepbound) 7.5 mg/0.5 mL injection            Discussed patient's BMI and to institute calorie reduction and increase exercise to decrease risk of diabetes and heart disease in the future.    Increased Zepbound to 7.5 mg weekly. Refilled today.    Take multivitamins every day while on Zepbound.     Continue with her healthy diet and exercise regimen.    Advised to stop eating once she feels full and has satiety.    Labs have been ordered, she/he will have these performed and we will contact her/him with results.  (CBC, CMP, Lipid)    If anything worsens or changes please call us at once, follow up in the office as planned,       Scribe Attestation  By signing my name below, IDaya, Scribe   attest that this documentation has been prepared under the direction and in the presence of Lex Stevens DO.         [1] No Known Allergies

## 2025-07-24 LAB
ALBUMIN SERPL-MCNC: 4.3 G/DL (ref 3.6–5.1)
ALP SERPL-CCNC: 41 U/L (ref 31–125)
ALT SERPL-CCNC: 19 U/L (ref 6–29)
ANION GAP SERPL CALCULATED.4IONS-SCNC: 8 MMOL/L (CALC) (ref 7–17)
AST SERPL-CCNC: 18 U/L (ref 10–35)
BASOPHILS # BLD AUTO: 23 CELLS/UL (ref 0–200)
BASOPHILS NFR BLD AUTO: 0.3 %
BILIRUB SERPL-MCNC: 0.6 MG/DL (ref 0.2–1.2)
BUN SERPL-MCNC: 10 MG/DL (ref 7–25)
CALCIUM SERPL-MCNC: 9.4 MG/DL (ref 8.6–10.2)
CHLORIDE SERPL-SCNC: 105 MMOL/L (ref 98–110)
CHOLEST SERPL-MCNC: 219 MG/DL
CHOLEST/HDLC SERPL: 4.3 (CALC)
CO2 SERPL-SCNC: 24 MMOL/L (ref 20–32)
CREAT SERPL-MCNC: 0.66 MG/DL (ref 0.5–0.99)
EGFRCR SERPLBLD CKD-EPI 2021: 110 ML/MIN/1.73M2
EOSINOPHIL # BLD AUTO: 122 CELLS/UL (ref 15–500)
EOSINOPHIL NFR BLD AUTO: 1.6 %
ERYTHROCYTE [DISTWIDTH] IN BLOOD BY AUTOMATED COUNT: 12.9 % (ref 11–15)
GLUCOSE SERPL-MCNC: 92 MG/DL (ref 65–99)
HCT VFR BLD AUTO: 36.9 % (ref 35–45)
HDLC SERPL-MCNC: 51 MG/DL
HGB BLD-MCNC: 11.9 G/DL (ref 11.7–15.5)
LDLC SERPL CALC-MCNC: 138 MG/DL (CALC)
LYMPHOCYTES # BLD AUTO: 2394 CELLS/UL (ref 850–3900)
LYMPHOCYTES NFR BLD AUTO: 31.5 %
MCH RBC QN AUTO: 29.1 PG (ref 27–33)
MCHC RBC AUTO-ENTMCNC: 32.2 G/DL (ref 32–36)
MCV RBC AUTO: 90.2 FL (ref 80–100)
MONOCYTES # BLD AUTO: 631 CELLS/UL (ref 200–950)
MONOCYTES NFR BLD AUTO: 8.3 %
NEUTROPHILS # BLD AUTO: 4431 CELLS/UL (ref 1500–7800)
NEUTROPHILS NFR BLD AUTO: 58.3 %
NONHDLC SERPL-MCNC: 168 MG/DL (CALC)
PLATELET # BLD AUTO: 326 THOUSAND/UL (ref 140–400)
PMV BLD REES-ECKER: 10.4 FL (ref 7.5–12.5)
POTASSIUM SERPL-SCNC: 4.5 MMOL/L (ref 3.5–5.3)
PROT SERPL-MCNC: 7.1 G/DL (ref 6.1–8.1)
RBC # BLD AUTO: 4.09 MILLION/UL (ref 3.8–5.1)
SODIUM SERPL-SCNC: 137 MMOL/L (ref 135–146)
TRIGL SERPL-MCNC: 163 MG/DL
WBC # BLD AUTO: 7.6 THOUSAND/UL (ref 3.8–10.8)

## 2025-08-04 RX ORDER — SEMAGLUTIDE 1 MG/.5ML
1 INJECTION, SOLUTION SUBCUTANEOUS WEEKLY
Qty: 2 ML | Refills: 1 | Status: SHIPPED | OUTPATIENT
Start: 2025-08-04 | End: 2025-08-07

## 2025-08-04 NOTE — TELEPHONE ENCOUNTER
Please advise     Concetta Dick to ERNESTO Garzon [id number] [facility] Clinical Support Staff (supporting Lex Stevens DO) (Selected Message)        8/3/25 10:04 PM  Hello again. So…I got the Zepbound prescription, and the cost was astronomical. After speaking with [facility], they are no longer going to cover the Zepbound for me.  They will, however cover Wegovy, but it has to be written as a 3 mo script to be covered. Something about changing their formulary? Wegovy is now preferred.   Is there a possibility to change medications, and if so, what dosage do you recommend starting at?      Thank you for all your help,  Concetta

## 2025-08-06 NOTE — TELEPHONE ENCOUNTER
Rosario message sent     Lex Stevens DO to Me (Selected Message)        8/4/25  5:40 PM  I sent the Wegovy 1 mg over which is a little bit lower it is more comparable to 7.5 she should take it in the evening and make sure that she has medicine or fiber to keep her bowels moving

## 2025-08-07 RX ORDER — SEMAGLUTIDE 1 MG/.5ML
1 INJECTION, SOLUTION SUBCUTANEOUS WEEKLY
Qty: 6 ML | Refills: 1 | Status: SHIPPED | OUTPATIENT
Start: 2025-08-07 | End: 2025-11-05

## 2025-08-07 NOTE — TELEPHONE ENCOUNTER
Concetta OROZCO Do [id number] [facility] Clinical Support Staff (supporting Lex Stevens DO) (Selected Message)        8/5/25  8:44 PM  Hello.  I got the Wegovy prescription, but when I went to pick it up the cost was $385.00. It was only written as a 28 day supply.  Can you please re send as a 3 month or 84 day supply. The cost is much lower this way for me.  It’s only $75 for the entire 3 month supply.       Thank you,  Concetta.

## 2025-12-12 ENCOUNTER — APPOINTMENT (OUTPATIENT)
Dept: OBSTETRICS AND GYNECOLOGY | Facility: CLINIC | Age: 45
End: 2025-12-12
Payer: COMMERCIAL

## (undated) DEVICE — SUTURE, PDS II, 3-0, 27 IN, SH, VIL, MONO, LF

## (undated) DEVICE — TOWEL PACK, STERILE, 4/PACK, BLUE

## (undated) DEVICE — SUTURE, VICRYL, 2-0, 27 IN, SH, UNDYED

## (undated) DEVICE — GLOVE, SURGICAL, PROTEXIS PI BLUE W/NEUTHERA, 6.5, PF, LF

## (undated) DEVICE — PAD, SANITARY, OBSTETRICAL, W/ADHSV STRIP,11 IN,LF

## (undated) DEVICE — Device

## (undated) DEVICE — PREP TRAY, VAGINAL

## (undated) DEVICE — BRIEF, PANTY, MESH, XL, 2PK

## (undated) DEVICE — IRRIGATION SET, CYSTOSCOPY, TURP, Y, CONTINUOUS, 81 IN

## (undated) DEVICE — LUBRICANT, JELLY, STERILE, 4OZ, FLIP TOP

## (undated) DEVICE — TIP, SUCTION, YANKAUER, FLEXIBLE

## (undated) DEVICE — IRRIGATION SET, CYSTOSCOPY, REGULATING CLAMP, STRAIGHT, 81 IN

## (undated) DEVICE — SOLUTION, IRRIGATION, SODIUM CHLORIDE 0.9%, 1000 ML, POUR BOTTLE

## (undated) DEVICE — SOLUTION, IRRIGATION, STERILE WATER, 1000 ML, POUR BOTTLE

## (undated) DEVICE — SUTURE, VICRYL, 2-0, 36 IN, CT-1, UNDYED

## (undated) DEVICE — ADHESIVE, SKIN, DERMABOND ADVANCED, 15CM, PEN-STYLE

## (undated) DEVICE — DRESSING, GAUZE, SUPER KERLIX, 6X6

## (undated) DEVICE — TRAY, SURESTEP, SILICONE DRAINAGE BAG, STATLOCK, 16FR

## (undated) DEVICE — DRAPE PACK, LAVH, W/ATTACHED LEGGINGS, W/POUCH, 100 X 114 IN, LF, STERILE

## (undated) DEVICE — TUBING, SUCTION, CONNECTING, 9/32 X 10FT, LF

## (undated) DEVICE — SUTURE, MONOCRYL, 3-0, 27 IN, PS-2, UNDYED

## (undated) DEVICE — RETRACTOR, SURGICAL, RING, PLASTIC, DISPOSABLE

## (undated) DEVICE — SUTURE, PDS II, 0, 27 IN, CT-2, VIOLET

## (undated) DEVICE — SUTURE, PDS II, 2-0, 27 IN, SH, VIOLET

## (undated) DEVICE — SUTURE, VICRYL, 0, 36 IN, CT-1, UNDYED

## (undated) DEVICE — DEVICE, SUTURE, CAPIO SLIM

## (undated) DEVICE — PACKING, VAGINAL, XRAY DETECTABLE, 2IN X 3YD, STERILE

## (undated) DEVICE — SUTURE, VICRYL, 0, 18 IN,TIE, UNDYED

## (undated) DEVICE — DRAPE, UNDERBUTTOCKS

## (undated) DEVICE — NEEDLE, TAPER, MAYO, 0.5 CIRCLE, DISPOSABLE, 6

## (undated) DEVICE — STAY SET, SURGICAL , 5MM SHARP HOOK, CS/ 50